# Patient Record
Sex: MALE | Race: WHITE | Employment: OTHER | URBAN - METROPOLITAN AREA
[De-identification: names, ages, dates, MRNs, and addresses within clinical notes are randomized per-mention and may not be internally consistent; named-entity substitution may affect disease eponyms.]

---

## 2023-06-22 ENCOUNTER — APPOINTMENT (OUTPATIENT)
Dept: GENERAL RADIOLOGY | Age: 63
End: 2023-06-22
Payer: MEDICAID

## 2023-06-22 ENCOUNTER — HOSPITAL ENCOUNTER (EMERGENCY)
Age: 63
Discharge: HOME | End: 2023-06-22
Attending: EMERGENCY MEDICINE
Payer: MEDICAID

## 2023-06-22 VITALS — HEART RATE: 58 BPM | OXYGEN SATURATION: 98 % | SYSTOLIC BLOOD PRESSURE: 120 MMHG | DIASTOLIC BLOOD PRESSURE: 93 MMHG

## 2023-06-22 DIAGNOSIS — R45.851 DEPRESSION WITH SUICIDAL IDEATION: Primary | ICD-10-CM

## 2023-06-22 DIAGNOSIS — F32.A DEPRESSION WITH SUICIDAL IDEATION: Primary | ICD-10-CM

## 2023-06-22 PROBLEM — F41.9 ANXIETY AND DEPRESSION: Status: ACTIVE | Noted: 2023-06-22

## 2023-06-22 PROBLEM — G47.9 SLEEP DISTURBANCES: Status: ACTIVE | Noted: 2023-06-22

## 2023-06-22 PROBLEM — Z65.8 PSYCHOSOCIAL STRESSORS: Status: ACTIVE | Noted: 2023-06-22

## 2023-06-22 PROBLEM — Z91.199 NONCOMPLIANCE: Status: ACTIVE | Noted: 2023-06-22

## 2023-06-22 PROBLEM — Z59.819 HOUSING INSTABILITY: Status: ACTIVE | Noted: 2023-06-22

## 2023-06-22 LAB
ALBUMIN SERPL-MCNC: 3.4 G/DL (ref 3.2–4.6)
ALBUMIN/GLOB SERPL: 0.9 (ref 0.4–1.6)
ALP SERPL-CCNC: 70 U/L (ref 50–136)
ALT SERPL-CCNC: 21 U/L (ref 12–65)
AMPHET UR QL SCN: NEGATIVE
ANION GAP SERPL CALC-SCNC: 4 MMOL/L (ref 2–11)
AST SERPL-CCNC: 19 U/L (ref 15–37)
BARBITURATES UR QL SCN: NEGATIVE
BASOPHILS # BLD: 0.1 K/UL (ref 0–0.2)
BASOPHILS NFR BLD: 1 % (ref 0–2)
BENZODIAZ UR QL: POSITIVE
BILIRUB SERPL-MCNC: 0.5 MG/DL (ref 0.2–1.1)
BUN SERPL-MCNC: 7 MG/DL (ref 8–23)
CALCIUM SERPL-MCNC: 8.8 MG/DL (ref 8.3–10.4)
CANNABINOIDS UR QL SCN: NEGATIVE
CHLORIDE SERPL-SCNC: 109 MMOL/L (ref 101–110)
CO2 SERPL-SCNC: 26 MMOL/L (ref 21–32)
COCAINE UR QL SCN: NEGATIVE
CREAT SERPL-MCNC: 0.8 MG/DL (ref 0.8–1.5)
DIFFERENTIAL METHOD BLD: ABNORMAL
EOSINOPHIL # BLD: 0.1 K/UL (ref 0–0.8)
EOSINOPHIL NFR BLD: 1 % (ref 0.5–7.8)
ERYTHROCYTE [DISTWIDTH] IN BLOOD BY AUTOMATED COUNT: 15.4 % (ref 11.9–14.6)
ETHANOL SERPL-MCNC: <3 MG/DL (ref 0–0.08)
GLOBULIN SER CALC-MCNC: 3.9 G/DL (ref 2.8–4.5)
GLUCOSE SERPL-MCNC: 81 MG/DL (ref 65–100)
HCT VFR BLD AUTO: 40.1 % (ref 41.1–50.3)
HGB BLD-MCNC: 13.3 G/DL (ref 13.6–17.2)
IMM GRANULOCYTES # BLD AUTO: 0 K/UL (ref 0–0.5)
IMM GRANULOCYTES NFR BLD AUTO: 0 % (ref 0–5)
LYMPHOCYTES # BLD: 1.7 K/UL (ref 0.5–4.6)
LYMPHOCYTES NFR BLD: 27 % (ref 13–44)
MCH RBC QN AUTO: 29.4 PG (ref 26.1–32.9)
MCHC RBC AUTO-ENTMCNC: 33.2 G/DL (ref 31.4–35)
MCV RBC AUTO: 88.5 FL (ref 82–102)
METHADONE UR QL: NEGATIVE
MONOCYTES # BLD: 0.7 K/UL (ref 0.1–1.3)
MONOCYTES NFR BLD: 11 % (ref 4–12)
NEUTS SEG # BLD: 3.6 K/UL (ref 1.7–8.2)
NEUTS SEG NFR BLD: 60 % (ref 43–78)
NRBC # BLD: 0 K/UL (ref 0–0.2)
NT PRO BNP: 456 PG/ML (ref 5–125)
OPIATES UR QL: NEGATIVE
PCP UR QL: NEGATIVE
PLATELET # BLD AUTO: 244 K/UL (ref 150–450)
PMV BLD AUTO: 10.5 FL (ref 9.4–12.3)
POTASSIUM SERPL-SCNC: 3.7 MMOL/L (ref 3.5–5.1)
PROT SERPL-MCNC: 7.3 G/DL (ref 6.3–8.2)
RBC # BLD AUTO: 4.53 M/UL (ref 4.23–5.6)
SODIUM SERPL-SCNC: 139 MMOL/L (ref 133–143)
TROPONIN I SERPL HS-MCNC: 6.4 PG/ML (ref 0–57)
WBC # BLD AUTO: 6.1 K/UL (ref 4.3–11.1)

## 2023-06-22 PROCEDURE — 80307 DRUG TEST PRSMV CHEM ANLYZR: CPT

## 2023-06-22 PROCEDURE — 85025 COMPLETE CBC W/AUTO DIFF WBC: CPT

## 2023-06-22 PROCEDURE — 83880 ASSAY OF NATRIURETIC PEPTIDE: CPT

## 2023-06-22 PROCEDURE — 99285 EMERGENCY DEPT VISIT HI MDM: CPT

## 2023-06-22 PROCEDURE — 6360000002 HC RX W HCPCS: Performed by: EMERGENCY MEDICINE

## 2023-06-22 PROCEDURE — 6370000000 HC RX 637 (ALT 250 FOR IP): Performed by: EMERGENCY MEDICINE

## 2023-06-22 PROCEDURE — 84484 ASSAY OF TROPONIN QUANT: CPT

## 2023-06-22 PROCEDURE — 82077 ASSAY SPEC XCP UR&BREATH IA: CPT

## 2023-06-22 PROCEDURE — 71045 X-RAY EXAM CHEST 1 VIEW: CPT

## 2023-06-22 PROCEDURE — 80053 COMPREHEN METABOLIC PANEL: CPT

## 2023-06-22 PROCEDURE — 96374 THER/PROPH/DIAG INJ IV PUSH: CPT

## 2023-06-22 PROCEDURE — 90792 PSYCH DIAG EVAL W/MED SRVCS: CPT | Performed by: NURSE PRACTITIONER

## 2023-06-22 RX ORDER — QUETIAPINE FUMARATE 200 MG/1
200 TABLET, FILM COATED ORAL 2 TIMES DAILY
Qty: 60 TABLET | Refills: 3 | Status: SHIPPED | OUTPATIENT
Start: 2023-06-22

## 2023-06-22 RX ORDER — THIAMINE HYDROCHLORIDE 100 MG/ML
100 INJECTION, SOLUTION INTRAMUSCULAR; INTRAVENOUS ONCE
Status: COMPLETED | OUTPATIENT
Start: 2023-06-22 | End: 2023-06-22

## 2023-06-22 RX ORDER — TRAZODONE HYDROCHLORIDE 150 MG/1
150 TABLET ORAL NIGHTLY PRN
Qty: 30 TABLET | Refills: 3 | Status: SHIPPED | OUTPATIENT
Start: 2023-06-22

## 2023-06-22 RX ORDER — LORAZEPAM 1 MG/1
1 TABLET ORAL ONCE
Status: COMPLETED | OUTPATIENT
Start: 2023-06-22 | End: 2023-06-22

## 2023-06-22 RX ADMIN — LORAZEPAM 1 MG: 1 TABLET ORAL at 12:03

## 2023-06-22 RX ADMIN — THIAMINE HYDROCHLORIDE 100 MG: 100 INJECTION, SOLUTION INTRAMUSCULAR; INTRAVENOUS at 12:03

## 2023-06-22 ASSESSMENT — ANXIETY QUESTIONNAIRES
2. NOT BEING ABLE TO STOP OR CONTROL WORRYING: 1
3. WORRYING TOO MUCH ABOUT DIFFERENT THINGS: 1
5. BEING SO RESTLESS THAT IT IS HARD TO SIT STILL: 1
7. FEELING AFRAID AS IF SOMETHING AWFUL MIGHT HAPPEN: 1
6. BECOMING EASILY ANNOYED OR IRRITABLE: 1
GAD7 TOTAL SCORE: 8
1. FEELING NERVOUS, ANXIOUS, OR ON EDGE: 2
4. TROUBLE RELAXING: 1

## 2023-06-22 ASSESSMENT — PATIENT HEALTH QUESTIONNAIRE - PHQ9: SUM OF ALL RESPONSES TO PHQ QUESTIONS 1-9: 7

## 2023-06-22 ASSESSMENT — ENCOUNTER SYMPTOMS: BACK PAIN: 1

## 2023-08-16 ENCOUNTER — HOSPITAL ENCOUNTER (EMERGENCY)
Age: 63
Discharge: HOME | End: 2023-08-17
Attending: EMERGENCY MEDICINE
Payer: MEDICAID

## 2023-08-16 DIAGNOSIS — F10.920 ACUTE ALCOHOLIC INTOXICATION WITHOUT COMPLICATION (HCC): ICD-10-CM

## 2023-08-16 DIAGNOSIS — R45.851 SUICIDAL IDEATION: ICD-10-CM

## 2023-08-16 DIAGNOSIS — I48.91 ATRIAL FIBRILLATION WITH RAPID VENTRICULAR RESPONSE (HCC): Primary | ICD-10-CM

## 2023-08-16 LAB
ALBUMIN SERPL-MCNC: 3.8 G/DL (ref 3.2–4.6)
ALBUMIN/GLOB SERPL: 0.8 (ref 0.4–1.6)
ALP SERPL-CCNC: 72 U/L (ref 50–136)
ALT SERPL-CCNC: 66 U/L (ref 12–65)
ANION GAP SERPL CALC-SCNC: 13 MMOL/L (ref 2–11)
APAP SERPL-MCNC: <2 UG/ML (ref 10–30)
AST SERPL-CCNC: 119 U/L (ref 15–37)
BASOPHILS # BLD: 0.1 K/UL (ref 0–0.2)
BASOPHILS NFR BLD: 1 % (ref 0–2)
BILIRUB SERPL-MCNC: 1 MG/DL (ref 0.2–1.1)
BUN SERPL-MCNC: 10 MG/DL (ref 8–23)
CALCIUM SERPL-MCNC: 8.7 MG/DL (ref 8.3–10.4)
CHLORIDE SERPL-SCNC: 101 MMOL/L (ref 101–110)
CO2 SERPL-SCNC: 21 MMOL/L (ref 21–32)
CREAT SERPL-MCNC: 0.8 MG/DL (ref 0.8–1.5)
DIFFERENTIAL METHOD BLD: ABNORMAL
EKG ATRIAL RATE: 137 BPM
EKG DIAGNOSIS: NORMAL
EKG Q-T INTERVAL: 316 MS
EKG QRS DURATION: 81 MS
EKG QTC CALCULATION (BAZETT): 456 MS
EKG R AXIS: 89 DEGREES
EKG T AXIS: 67 DEGREES
EKG VENTRICULAR RATE: 125 BPM
EOSINOPHIL # BLD: 0 K/UL (ref 0–0.8)
EOSINOPHIL NFR BLD: 1 % (ref 0.5–7.8)
ERYTHROCYTE [DISTWIDTH] IN BLOOD BY AUTOMATED COUNT: 17.7 % (ref 11.9–14.6)
ETHANOL SERPL-MCNC: 268 MG/DL (ref 0–0.08)
GLOBULIN SER CALC-MCNC: 4.5 G/DL (ref 2.8–4.5)
GLUCOSE SERPL-MCNC: 103 MG/DL (ref 65–100)
HCT VFR BLD AUTO: 49.6 % (ref 41.1–50.3)
HGB BLD-MCNC: 16.8 G/DL (ref 13.6–17.2)
IMM GRANULOCYTES # BLD AUTO: 0 K/UL (ref 0–0.5)
IMM GRANULOCYTES NFR BLD AUTO: 1 % (ref 0–5)
LYMPHOCYTES # BLD: 1.8 K/UL (ref 0.5–4.6)
LYMPHOCYTES NFR BLD: 31 % (ref 13–44)
MAGNESIUM SERPL-MCNC: 2 MG/DL (ref 1.8–2.4)
MCH RBC QN AUTO: 29.2 PG (ref 26.1–32.9)
MCHC RBC AUTO-ENTMCNC: 33.9 G/DL (ref 31.4–35)
MCV RBC AUTO: 86.3 FL (ref 82–102)
MONOCYTES # BLD: 0.4 K/UL (ref 0.1–1.3)
MONOCYTES NFR BLD: 6 % (ref 4–12)
NEUTS SEG # BLD: 3.6 K/UL (ref 1.7–8.2)
NEUTS SEG NFR BLD: 61 % (ref 43–78)
NRBC # BLD: 0 K/UL (ref 0–0.2)
PLATELET # BLD AUTO: 177 K/UL (ref 150–450)
PMV BLD AUTO: 11.8 FL (ref 9.4–12.3)
POTASSIUM SERPL-SCNC: 4.8 MMOL/L (ref 3.5–5.1)
PROT SERPL-MCNC: 8.3 G/DL (ref 6.3–8.2)
RBC # BLD AUTO: 5.75 M/UL (ref 4.23–5.6)
SALICYLATES SERPL-MCNC: 2.1 MG/DL (ref 2.8–20)
SODIUM SERPL-SCNC: 135 MMOL/L (ref 133–143)
WBC # BLD AUTO: 6 K/UL (ref 4.3–11.1)

## 2023-08-16 PROCEDURE — 6360000002 HC RX W HCPCS: Performed by: EMERGENCY MEDICINE

## 2023-08-16 PROCEDURE — 96374 THER/PROPH/DIAG INJ IV PUSH: CPT

## 2023-08-16 PROCEDURE — 6370000000 HC RX 637 (ALT 250 FOR IP): Performed by: EMERGENCY MEDICINE

## 2023-08-16 PROCEDURE — 83735 ASSAY OF MAGNESIUM: CPT

## 2023-08-16 PROCEDURE — 93010 ELECTROCARDIOGRAM REPORT: CPT | Performed by: INTERNAL MEDICINE

## 2023-08-16 PROCEDURE — 80143 DRUG ASSAY ACETAMINOPHEN: CPT

## 2023-08-16 PROCEDURE — 93005 ELECTROCARDIOGRAM TRACING: CPT | Performed by: EMERGENCY MEDICINE

## 2023-08-16 PROCEDURE — 80179 DRUG ASSAY SALICYLATE: CPT

## 2023-08-16 PROCEDURE — 2500000003 HC RX 250 WO HCPCS: Performed by: EMERGENCY MEDICINE

## 2023-08-16 PROCEDURE — 96375 TX/PRO/DX INJ NEW DRUG ADDON: CPT

## 2023-08-16 PROCEDURE — 99285 EMERGENCY DEPT VISIT HI MDM: CPT

## 2023-08-16 PROCEDURE — 80053 COMPREHEN METABOLIC PANEL: CPT

## 2023-08-16 PROCEDURE — 82077 ASSAY SPEC XCP UR&BREATH IA: CPT

## 2023-08-16 PROCEDURE — 96376 TX/PRO/DX INJ SAME DRUG ADON: CPT

## 2023-08-16 PROCEDURE — 85025 COMPLETE CBC W/AUTO DIFF WBC: CPT

## 2023-08-16 RX ORDER — LORAZEPAM 1 MG/1
1 TABLET ORAL
Status: DISCONTINUED | OUTPATIENT
Start: 2023-08-16 | End: 2023-08-17 | Stop reason: HOSPADM

## 2023-08-16 RX ORDER — LORAZEPAM 2 MG/ML
3 INJECTION INTRAMUSCULAR
Status: DISCONTINUED | OUTPATIENT
Start: 2023-08-16 | End: 2023-08-17 | Stop reason: HOSPADM

## 2023-08-16 RX ORDER — LORAZEPAM 2 MG/ML
4 INJECTION INTRAMUSCULAR
Status: DISCONTINUED | OUTPATIENT
Start: 2023-08-16 | End: 2023-08-17 | Stop reason: HOSPADM

## 2023-08-16 RX ORDER — LORAZEPAM 2 MG/ML
2 INJECTION INTRAMUSCULAR
Status: DISCONTINUED | OUTPATIENT
Start: 2023-08-16 | End: 2023-08-17 | Stop reason: HOSPADM

## 2023-08-16 RX ORDER — SODIUM CHLORIDE 0.9 % (FLUSH) 0.9 %
5-40 SYRINGE (ML) INJECTION PRN
Status: DISCONTINUED | OUTPATIENT
Start: 2023-08-16 | End: 2023-08-17 | Stop reason: HOSPADM

## 2023-08-16 RX ORDER — MAGNESIUM HYDROXIDE/ALUMINUM HYDROXICE/SIMETHICONE 120; 1200; 1200 MG/30ML; MG/30ML; MG/30ML
30 SUSPENSION ORAL EVERY 6 HOURS PRN
Status: DISCONTINUED | OUTPATIENT
Start: 2023-08-16 | End: 2023-08-17 | Stop reason: HOSPADM

## 2023-08-16 RX ORDER — SODIUM CHLORIDE 9 MG/ML
INJECTION, SOLUTION INTRAVENOUS PRN
Status: DISCONTINUED | OUTPATIENT
Start: 2023-08-16 | End: 2023-08-17 | Stop reason: HOSPADM

## 2023-08-16 RX ORDER — LORAZEPAM 1 MG/1
2 TABLET ORAL
Status: DISCONTINUED | OUTPATIENT
Start: 2023-08-16 | End: 2023-08-17 | Stop reason: HOSPADM

## 2023-08-16 RX ORDER — LORAZEPAM 2 MG/ML
1 INJECTION INTRAMUSCULAR ONCE
Status: COMPLETED | OUTPATIENT
Start: 2023-08-16 | End: 2023-08-16

## 2023-08-16 RX ORDER — METOPROLOL TARTRATE 5 MG/5ML
5 INJECTION INTRAVENOUS
Status: COMPLETED | OUTPATIENT
Start: 2023-08-16 | End: 2023-08-16

## 2023-08-16 RX ORDER — LANOLIN ALCOHOL/MO/W.PET/CERES
100 CREAM (GRAM) TOPICAL DAILY
Status: DISCONTINUED | OUTPATIENT
Start: 2023-08-17 | End: 2023-08-17 | Stop reason: HOSPADM

## 2023-08-16 RX ORDER — LORAZEPAM 1 MG/1
3 TABLET ORAL
Status: DISCONTINUED | OUTPATIENT
Start: 2023-08-16 | End: 2023-08-17 | Stop reason: HOSPADM

## 2023-08-16 RX ORDER — LORAZEPAM 1 MG/1
4 TABLET ORAL
Status: DISCONTINUED | OUTPATIENT
Start: 2023-08-16 | End: 2023-08-17 | Stop reason: HOSPADM

## 2023-08-16 RX ORDER — HYDROCODONE BITARTRATE AND ACETAMINOPHEN 5; 325 MG/1; MG/1
1 TABLET ORAL ONCE
Status: COMPLETED | OUTPATIENT
Start: 2023-08-16 | End: 2023-08-16

## 2023-08-16 RX ORDER — LORAZEPAM 2 MG/ML
1 INJECTION INTRAMUSCULAR
Status: DISCONTINUED | OUTPATIENT
Start: 2023-08-16 | End: 2023-08-17 | Stop reason: HOSPADM

## 2023-08-16 RX ORDER — FAMOTIDINE 20 MG/1
20 TABLET, FILM COATED ORAL 2 TIMES DAILY
Status: DISCONTINUED | OUTPATIENT
Start: 2023-08-16 | End: 2023-08-17 | Stop reason: HOSPADM

## 2023-08-16 RX ORDER — SODIUM CHLORIDE 0.9 % (FLUSH) 0.9 %
5-40 SYRINGE (ML) INJECTION EVERY 12 HOURS SCHEDULED
Status: DISCONTINUED | OUTPATIENT
Start: 2023-08-16 | End: 2023-08-17 | Stop reason: HOSPADM

## 2023-08-16 RX ADMIN — METOPROLOL TARTRATE 5 MG: 5 INJECTION INTRAVENOUS at 20:55

## 2023-08-16 RX ADMIN — METOPROLOL TARTRATE 5 MG: 5 INJECTION INTRAVENOUS at 21:30

## 2023-08-16 RX ADMIN — METOPROLOL TARTRATE 25 MG: 25 TABLET, FILM COATED ORAL at 21:30

## 2023-08-16 RX ADMIN — LORAZEPAM 1 MG: 2 INJECTION INTRAMUSCULAR; INTRAVENOUS at 21:30

## 2023-08-16 RX ADMIN — METOPROLOL TARTRATE 5 MG: 5 INJECTION INTRAVENOUS at 19:47

## 2023-08-16 RX ADMIN — HYDROCODONE BITARTRATE AND ACETAMINOPHEN 1 TABLET: 5; 325 TABLET ORAL at 21:30

## 2023-08-16 RX ADMIN — FAMOTIDINE 20 MG: 20 TABLET, FILM COATED ORAL at 23:20

## 2023-08-16 RX ADMIN — LORAZEPAM 1 MG: 2 INJECTION INTRAMUSCULAR; INTRAVENOUS at 19:47

## 2023-08-16 ASSESSMENT — ENCOUNTER SYMPTOMS
ABDOMINAL PAIN: 0
SHORTNESS OF BREATH: 0
COUGH: 0
VOMITING: 0

## 2023-08-16 ASSESSMENT — PAIN - FUNCTIONAL ASSESSMENT: PAIN_FUNCTIONAL_ASSESSMENT: 0-10

## 2023-08-16 ASSESSMENT — PAIN SCALES - GENERAL: PAINLEVEL_OUTOF10: 0

## 2023-08-16 ASSESSMENT — LIFESTYLE VARIABLES
HOW MANY STANDARD DRINKS CONTAINING ALCOHOL DO YOU HAVE ON A TYPICAL DAY: 10 OR MORE
HOW OFTEN DO YOU HAVE A DRINK CONTAINING ALCOHOL: 4 OR MORE TIMES A WEEK

## 2023-08-16 NOTE — ED PROVIDER NOTES
Emergency Department Provider Note       PCP: None None (Inactive)   Age: 65 y.o. Sex: male     [address] 08/16/2023 10:11:54 PM       ICD-10-CM    1. Atrial fibrillation with rapid ventricular response (HCC)  I48.91       2. Acute alcoholic intoxication without complication (HCC)  O80.667       3. Suicidal ideation  R45.851           Medical Decision Making   Depressed patient with suicidal ideations. Likely will need hospitalization. We will have psychiatric evaluation toward morning. Regarding atrial fibrillation, low-dose IV beta-blocker. Also may have some DTs and will give a small dose of IV Ativan. Check screening lab work. Complexity of Problems Addressed:  1 or more acute illnesses that pose a threat to life or bodily function. Data Reviewed and Analyzed:   I independently ordered and reviewed each unique test.  I reviewed external records: provider visit note from outside specialist.  Previous admission for atrial fibrillation along with alcohol withdrawal.  The patients assessment required an independent historian: EMS. The reason they were needed is important historical information not provided by the patient. Heart rate control at the scene. Other vital signs    I independently ordered and interpreted the ED EKG in the absence of a Cardiologist.    Rate: 125  EKG Interpretation: EKG Interpretation: atrial fibrillation  ST Segments: Nonspecific ST segments - NO STEMI      Discussion of management or test interpretation. Received some IV Ativan and some IV then p.o. Lopressor. Heart rate less than 100. Suspect atrial febrile rapid ventricular response due to patient not taking medications and also some mild withdrawal symptoms and perhaps some dehydration. Patient agrees to rest here to metabolize alcohol, treating withdrawal symptoms and talk with psychiatry in the morning.       Risk of Complications and/or Morbidity of Patient Management:  Parental

## 2023-08-16 NOTE — ED NOTES
[facility]; sitter is at bedside. Pt in transition from clothing to paper scrubs. Oncoming nurse notified. Pt on cardiac monitor for afib RVR.         Roz Wong RN  08/16/23 1910

## 2023-08-16 NOTE — ED TRIAGE NOTES
Pt arrived via EMS from home. Roommate called for pt fatigue/not eating. Pt reports drinking vodka today/yesterday. When EMS arrived pt sat low 90s on monitor; placed on 2L NC, pt afib RVR (150-160). Pt given 700ml saline. Pt reporting SI \" I want to walk into traffic\" \" I want to stab myself with a knife\" \"I want to fill a syringe with air and inject it in my veins\". \" As soon as I get out of here I'm going to do it\". Pt repeatedly telling nurse he's an alcoholic and drink vodka daily.  Pt reports drinking a pint of vodka today

## 2023-08-17 VITALS
RESPIRATION RATE: 20 BRPM | BODY MASS INDEX: 24.4 KG/M2 | TEMPERATURE: 97.7 F | HEART RATE: 51 BPM | WEIGHT: 161 LBS | DIASTOLIC BLOOD PRESSURE: 103 MMHG | OXYGEN SATURATION: 98 % | HEIGHT: 68 IN | SYSTOLIC BLOOD PRESSURE: 155 MMHG

## 2023-08-17 PROBLEM — F10.10 ALCOHOL ABUSE: Status: ACTIVE | Noted: 2023-08-17

## 2023-08-17 PROBLEM — F10.920 ACUTE ALCOHOLIC INTOXICATION WITHOUT COMPLICATION (HCC): Status: ACTIVE | Noted: 2023-08-17

## 2023-08-17 LAB
AMPHET UR QL SCN: NEGATIVE
BACTERIA URNS QL MICRO: ABNORMAL /HPF
BARBITURATES UR QL SCN: NEGATIVE
BENZODIAZ UR QL: NEGATIVE
BILIRUB UR QL: ABNORMAL
CANNABINOIDS UR QL SCN: NEGATIVE
CASTS URNS QL MICRO: ABNORMAL /LPF
COCAINE UR QL SCN: NEGATIVE
CRYSTALS URNS QL MICRO: 0 /LPF
EPI CELLS #/AREA URNS HPF: ABNORMAL /HPF
GLUCOSE UR QL STRIP.AUTO: 100 MG/DL
KETONES UR-MCNC: 15 MG/DL
LEUKOCYTE ESTERASE UR QL STRIP: NEGATIVE
METHADONE UR QL: NEGATIVE
MUCOUS THREADS URNS QL MICRO: ABNORMAL /LPF
NITRITE UR QL: POSITIVE
OPIATES UR QL: POSITIVE
PCP UR QL: NEGATIVE
PH UR: 6 (ref 5–9)
PROT UR QL: >300 MG/DL
RBC # UR STRIP: NEGATIVE
RBC #/AREA URNS HPF: ABNORMAL /HPF
SERVICE CMNT-IMP: ABNORMAL
SP GR UR: >1.03 (ref 1–1.02)
UROBILINOGEN UR QL: >=8 EU/DL (ref 0.2–1)
WBC URNS QL MICRO: ABNORMAL /HPF

## 2023-08-17 PROCEDURE — 6370000000 HC RX 637 (ALT 250 FOR IP): Performed by: EMERGENCY MEDICINE

## 2023-08-17 PROCEDURE — 80307 DRUG TEST PRSMV CHEM ANLYZR: CPT

## 2023-08-17 PROCEDURE — 96376 TX/PRO/DX INJ SAME DRUG ADON: CPT

## 2023-08-17 PROCEDURE — 81003 URINALYSIS AUTO W/O SCOPE: CPT

## 2023-08-17 PROCEDURE — 81001 URINALYSIS AUTO W/SCOPE: CPT

## 2023-08-17 PROCEDURE — 81015 MICROSCOPIC EXAM OF URINE: CPT

## 2023-08-17 PROCEDURE — 6360000002 HC RX W HCPCS: Performed by: EMERGENCY MEDICINE

## 2023-08-17 RX ORDER — QUETIAPINE FUMARATE 100 MG/1
200 TABLET, FILM COATED ORAL 2 TIMES DAILY
Status: DISCONTINUED | OUTPATIENT
Start: 2023-08-17 | End: 2023-08-17 | Stop reason: HOSPADM

## 2023-08-17 RX ORDER — TRAZODONE HYDROCHLORIDE 100 MG/1
TABLET ORAL
COMMUNITY
Start: 2023-07-19 | End: 2023-08-17 | Stop reason: SDUPTHER

## 2023-08-17 RX ORDER — APIXABAN 5 MG/1
TABLET, FILM COATED ORAL
COMMUNITY
Start: 2023-06-15 | End: 2023-08-17 | Stop reason: SDUPTHER

## 2023-08-17 RX ORDER — TRAZODONE HYDROCHLORIDE 50 MG/1
150 TABLET ORAL NIGHTLY
Status: DISCONTINUED | OUTPATIENT
Start: 2023-08-17 | End: 2023-08-17 | Stop reason: HOSPADM

## 2023-08-17 RX ORDER — TRAZODONE HYDROCHLORIDE 100 MG/1
100 TABLET ORAL NIGHTLY
Qty: 30 TABLET | Refills: 0 | Status: SHIPPED | OUTPATIENT
Start: 2023-08-17 | End: 2023-09-16

## 2023-08-17 RX ORDER — QUETIAPINE FUMARATE 200 MG/1
200 TABLET, FILM COATED ORAL NIGHTLY
Qty: 60 TABLET | Refills: 3 | Status: SHIPPED | OUTPATIENT
Start: 2023-08-17

## 2023-08-17 RX ORDER — METOPROLOL SUCCINATE 25 MG/1
25 TABLET, EXTENDED RELEASE ORAL DAILY
Status: DISCONTINUED | OUTPATIENT
Start: 2023-08-17 | End: 2023-08-17 | Stop reason: HOSPADM

## 2023-08-17 RX ORDER — APIXABAN 5 MG/1
5 TABLET, FILM COATED ORAL 2 TIMES DAILY
Qty: 60 TABLET | Refills: 0 | Status: SHIPPED | OUTPATIENT
Start: 2023-08-17

## 2023-08-17 RX ADMIN — ALUMINUM HYDROXIDE, MAGNESIUM HYDROXIDE, AND SIMETHICONE 30 ML: 200; 200; 20 SUSPENSION ORAL at 00:29

## 2023-08-17 RX ADMIN — METOPROLOL SUCCINATE 25 MG: 25 TABLET, EXTENDED RELEASE ORAL at 10:44

## 2023-08-17 RX ADMIN — TRAZODONE HYDROCHLORIDE 150 MG: 50 TABLET ORAL at 00:26

## 2023-08-17 RX ADMIN — LORAZEPAM 2 MG: 2 INJECTION INTRAMUSCULAR; INTRAVENOUS at 05:27

## 2023-08-17 RX ADMIN — QUETIAPINE FUMARATE 200 MG: 100 TABLET ORAL at 10:44

## 2023-08-17 RX ADMIN — Medication 100 MG: at 10:44

## 2023-08-17 RX ADMIN — FAMOTIDINE 20 MG: 20 TABLET, FILM COATED ORAL at 10:44

## 2023-08-17 RX ADMIN — QUETIAPINE FUMARATE 200 MG: 100 TABLET ORAL at 00:26

## 2023-08-17 ASSESSMENT — PAIN SCALES - GENERAL: PAINLEVEL_OUTOF10: 10

## 2023-08-17 ASSESSMENT — PAIN - FUNCTIONAL ASSESSMENT: PAIN_FUNCTIONAL_ASSESSMENT: 0-10

## 2023-08-17 ASSESSMENT — PAIN DESCRIPTION - LOCATION: LOCATION: SHOULDER

## 2023-08-17 NOTE — ED NOTES
Patient resting at this time. No signs or symptoms of distress. Respirations even and unlabored. Sitter at bedside. Safety precautions in place.        Reinier Lehman RN  08/16/23 4763

## 2023-08-17 NOTE — CARE COORDINATION
Chart review complete, CM met with pt at bedside, pt found laying on stretcher alert and oriented x4, noted very sleepy and easily falls back to sleep. Pt states he recently left the [employer] to live in a house with some other roommates pt states he can return if dc from ED today or after inpt admission if needed. Pt states he has been having issues with increased depression and having SI with plan to jump off bridge or walk out into traffic but states he is too afraid to do so d/t he does not want to harm himself. Pt was seen in ED in June 2023 and referred to the [facility] where he states he has been following up. Pt states heavy ETOH use over that last several days. Pt denies auditory or visual hallucinations at this time. Pt is awaiting psych evaluation today. CM will made appropriate referrals as needed. 08/17/23 5022   Service Assessment   Patient Orientation Alert and Oriented   Cognition Alert   History Provided By Patient   Primary Caregiver Self   Accompanied By/Relationship none   Support Systems None   PCP Verified by CM Yes  ([facility] per pt last visit a couple weeks ago)   Last Visit to PCP Within last 3 months   Prior Functional Level Independent in ADLs/IADLs   Current Functional Level Independent in ADLs/IADLs   Can patient return to prior living arrangement Yes   Ability to make needs known: Good   Family able to assist with home care needs: No   Would you like for me to discuss the discharge plan with any other family members/significant others, and if so, who?  No   Financial Resources [place] None   CM/SW Referral Psychiatry   Social/Functional History   Lives With Friend(s)   Type of Home House   Bathroom Shower/Tub Tub/Shower unit   [address] Independent   Transfer Assistance Independent   Occupation Unemployed   Discharge Planning   Type of Residence

## 2023-08-17 NOTE — CARE COORDINATION
Pt has been evaluated by Eunice Quiles NP and cleared for dc home today, pt has followup appointment for Oct 6, 2023 at [facility]. MD and primary RN provided information.        08/17/23 1403   Service Assessment   Patient Orientation Alert and Oriented   Discharge Planning   Type of [address] Friends   Patient expects to be discharged to: [facility]   Transition of Care Consult (CM Consult) Other  [facility] follow up)   Services At/After Discharge In cab

## 2023-08-17 NOTE — ED NOTES
Patient placed in paper scrubs and security did wand patient as well.       Osito Jin RN  08/16/23 3363

## 2023-08-17 NOTE — ED NOTES
Patient resting at this time. No signs or symptoms of distress. Respirations even and unlabored. Sitter at bedside. Safety precautions in place.        Bautista Armando RN  08/17/23 5612

## 2023-08-17 NOTE — ED NOTES
Patient resting at this time. No signs or symptoms of distress. Respirations even and unlabored. Sitter at bedside. Safety precautions in place.        Aurea Sanchez RN  08/17/23 7983

## 2023-08-17 NOTE — ED NOTES
Pt resting in bed with eyes closed, sitter at bedside, resp easy, will continue to monitor      Michelle Arita RN  08/17/23 1615

## 2023-08-17 NOTE — ED NOTES
Patient resting at this time. No signs or symptoms of distress. Respirations even and unlabored. Sitter at bedside. Safety precautions in place.        Pita West RN  08/17/23 3180

## 2023-08-17 NOTE — ED NOTES
Patient resting at this time. No signs or symptoms of distress. Respirations even and unlabored. Sitter at bedside. Safety precautions in place.        Kenney Dalton RN  08/17/23 5455

## 2023-08-17 NOTE — ED NOTES
Pt given hot lunch with sprite and sitting up and eating. No signs of distress.      Joane Lennox, RN  08/17/23 3245

## 2023-08-17 NOTE — DISCHARGE INSTRUCTIONS
Take medications as prescribed  Limit your alcohol intake  Drink plenty of clear liquids  Follow-up with mental health as scheduled  Follow-up with your regular medical doctors as soon as possible    Return to ER for any worsening symptoms or new problems which may arise
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
## 2023-08-17 NOTE — ED PROVIDER NOTES
Recheck note  8/17/2023    Patient evaluated today  He is currently awake alert  States he is feeling better  Vital signs this morning revealed no fever normal O2 sats normal blood pressure heart rate remains slightly elevated in the 1 teens  Patient has been seen and evaluated by psychiatry.   Currently denying any suicidal ideations    On exam he is awake alert and oriented  Ambulatory with a steady gait    Will discharge home today restarting Lopressor and Eliquis for his A-fib  Restarting Seroquel and trazodone    We will confirm mental health appointments prior to discharge     Bora Lin MD  08/17/23 7896

## 2023-08-27 ENCOUNTER — APPOINTMENT (OUTPATIENT)
Dept: GENERAL RADIOLOGY | Age: 63
End: 2023-08-27
Payer: MEDICAID

## 2023-08-27 ENCOUNTER — HOSPITAL ENCOUNTER (EMERGENCY)
Age: 63
Discharge: HOME | End: 2023-08-27
Attending: GENERAL PRACTICE
Payer: MEDICAID

## 2023-08-27 VITALS
OXYGEN SATURATION: 95 % | HEART RATE: 89 BPM | RESPIRATION RATE: 18 BRPM | SYSTOLIC BLOOD PRESSURE: 117 MMHG | WEIGHT: 230 LBS | TEMPERATURE: 97.5 F | HEIGHT: 68 IN | DIASTOLIC BLOOD PRESSURE: 65 MMHG | BODY MASS INDEX: 34.86 KG/M2

## 2023-08-27 DIAGNOSIS — J18.9 PNEUMONIA OF RIGHT LUNG DUE TO INFECTIOUS ORGANISM, UNSPECIFIED PART OF LUNG: ICD-10-CM

## 2023-08-27 DIAGNOSIS — U07.1 COVID-19 VIRUS INFECTION: Primary | ICD-10-CM

## 2023-08-27 PROCEDURE — 71045 X-RAY EXAM CHEST 1 VIEW: CPT

## 2023-08-27 PROCEDURE — 96372 THER/PROPH/DIAG INJ SC/IM: CPT

## 2023-08-27 PROCEDURE — 6360000002 HC RX W HCPCS: Performed by: GENERAL PRACTICE

## 2023-08-27 PROCEDURE — 99285 EMERGENCY DEPT VISIT HI MDM: CPT

## 2023-08-27 RX ORDER — LORAZEPAM 2 MG/ML
1 INJECTION INTRAMUSCULAR ONCE
Status: COMPLETED | OUTPATIENT
Start: 2023-08-27 | End: 2023-08-27

## 2023-08-27 RX ORDER — LORAZEPAM 1 MG/1
1 TABLET ORAL EVERY 4 HOURS PRN
Status: DISCONTINUED | OUTPATIENT
Start: 2023-08-27 | End: 2023-08-27

## 2023-08-27 RX ORDER — AZITHROMYCIN 250 MG/1
250 TABLET, FILM COATED ORAL SEE ADMIN INSTRUCTIONS
Qty: 6 TABLET | Refills: 0 | Status: SHIPPED | OUTPATIENT
Start: 2023-08-27 | End: 2023-09-01

## 2023-08-27 RX ADMIN — LORAZEPAM 1 MG: 2 INJECTION INTRAMUSCULAR; INTRAVENOUS at 17:06

## 2023-08-27 ASSESSMENT — PAIN DESCRIPTION - DESCRIPTORS: DESCRIPTORS: ACHING

## 2023-08-27 ASSESSMENT — PAIN DESCRIPTION - LOCATION: LOCATION: HEAD

## 2023-08-27 ASSESSMENT — PAIN DESCRIPTION - PAIN TYPE: TYPE: ACUTE PAIN

## 2023-08-27 ASSESSMENT — PAIN SCALES - GENERAL: PAINLEVEL_OUTOF10: 5

## 2023-08-27 ASSESSMENT — PAIN - FUNCTIONAL ASSESSMENT: PAIN_FUNCTIONAL_ASSESSMENT: 0-10

## 2023-08-27 NOTE — ED PROVIDER NOTES
Emergency Department Provider Note       PCP: None None   Age: 65 y.o. Sex: male     DISPOSITION Decision To Discharge 08/27/2023 05:57:12 PM       ICD-10-CM    1. COVID-19 virus infection  U07.1       2. Pneumonia of right lung due to infectious organism, unspecified part of lung  J18.9           Medical Decision Making     Complexity of Problems Addressed:  1 or more acute illnesses that pose a threat to life or bodily function. Data Reviewed and Analyzed:  I independently ordered and reviewed each unique test.  I reviewed external records: provider visit note from outside specialist.  I reviewed multidisciplinary note from [address] [place] [address] from June 13, 2023  The patients assessment required an independent historian: Paramedic. The reason they were needed is important historical information not provided by the patient. I interpreted the X-rays right upper lobe and middle lobe infiltrates. No edema normal heart size. I have reviewed the radiology report. Discussion of management or test interpretation. Patient presents with COVID-19 infection and shortness of breath. However on exam he is speaking full sentences and has no respiratory distress. His oxygen levels are normal.  He does have pneumonia which could be consistent with COVID-19. Nevertheless I will treat for bacterial pneumonia. I did want to get baseline blood work here but the patient was a difficult stick and he did not want any more attempts. I am okay with that considering his vital signs are stable and he has no respiratory distress. He has had the symptoms for about a week so I expect he probably is on the tail end of things. I will go ahead and empirically treat with antibiotics for potential superinfection bacterial pneumonia. However I do not feel he needs to be admitted to the hospital as he is not requiring oxygen and in no distress. He feels agreeable and stable to go back to [facility] as well.   Strict return precautions are

## 2023-10-23 ENCOUNTER — APPOINTMENT (OUTPATIENT)
Dept: GENERAL RADIOLOGY | Age: 63
End: 2023-10-23
Payer: MEDICAID

## 2023-10-23 ENCOUNTER — HOSPITAL ENCOUNTER (EMERGENCY)
Age: 63
Discharge: PSYCHIATRIC OR SUBSTANCE USE FACILITY | End: 2023-10-24
Attending: EMERGENCY MEDICINE
Payer: MEDICAID

## 2023-10-23 DIAGNOSIS — F10.10 ALCOHOL ABUSE: ICD-10-CM

## 2023-10-23 DIAGNOSIS — R45.851 SUICIDAL IDEATION: Primary | ICD-10-CM

## 2023-10-23 LAB
ALBUMIN SERPL-MCNC: 3.6 G/DL (ref 3.2–4.6)
ALBUMIN/GLOB SERPL: 0.7 (ref 0.4–1.6)
ALP SERPL-CCNC: 101 U/L (ref 50–136)
ALT SERPL-CCNC: 32 U/L (ref 12–65)
AMPHET UR QL SCN: NEGATIVE
ANION GAP SERPL CALC-SCNC: 9 MMOL/L (ref 2–11)
APAP SERPL-MCNC: <2 UG/ML (ref 10–30)
AST SERPL-CCNC: 61 U/L (ref 15–37)
BARBITURATES UR QL SCN: NEGATIVE
BASOPHILS # BLD: 0.1 K/UL (ref 0–0.2)
BASOPHILS NFR BLD: 1 % (ref 0–2)
BENZODIAZ UR QL: NEGATIVE
BILIRUB SERPL-MCNC: 0.6 MG/DL (ref 0.2–1.1)
BUN SERPL-MCNC: 4 MG/DL (ref 8–23)
CALCIUM SERPL-MCNC: 8.6 MG/DL (ref 8.3–10.4)
CANNABINOIDS UR QL SCN: NEGATIVE
CHLORIDE SERPL-SCNC: 107 MMOL/L (ref 101–110)
CO2 SERPL-SCNC: 24 MMOL/L (ref 21–32)
COCAINE UR QL SCN: NEGATIVE
CREAT SERPL-MCNC: 0.8 MG/DL (ref 0.8–1.5)
D DIMER PPP FEU-MCNC: 0.6 UG/ML(FEU)
DIFFERENTIAL METHOD BLD: ABNORMAL
EKG ATRIAL RATE: 233 BPM
EKG DIAGNOSIS: NORMAL
EKG Q-T INTERVAL: 356 MS
EKG QRS DURATION: 87 MS
EKG QTC CALCULATION (BAZETT): 482 MS
EKG R AXIS: 75 DEGREES
EKG T AXIS: 56 DEGREES
EKG VENTRICULAR RATE: 110 BPM
EOSINOPHIL # BLD: 0.1 K/UL (ref 0–0.8)
EOSINOPHIL NFR BLD: 1 % (ref 0.5–7.8)
ERYTHROCYTE [DISTWIDTH] IN BLOOD BY AUTOMATED COUNT: 15.7 % (ref 11.9–14.6)
ETHANOL SERPL-MCNC: 163 MG/DL (ref 0–0.08)
GLOBULIN SER CALC-MCNC: 5.4 G/DL (ref 2.8–4.5)
GLUCOSE SERPL-MCNC: 89 MG/DL (ref 65–100)
HCT VFR BLD AUTO: 48.5 % (ref 41.1–50.3)
HGB BLD-MCNC: 15.4 G/DL (ref 13.6–17.2)
IMM GRANULOCYTES # BLD AUTO: 0 K/UL (ref 0–0.5)
IMM GRANULOCYTES NFR BLD AUTO: 0 % (ref 0–5)
LIPASE SERPL-CCNC: 70 U/L (ref 73–393)
LYMPHOCYTES # BLD: 3.1 K/UL (ref 0.5–4.6)
LYMPHOCYTES NFR BLD: 35 % (ref 13–44)
MAGNESIUM SERPL-MCNC: 2.2 MG/DL (ref 1.8–2.4)
MCH RBC QN AUTO: 28.1 PG (ref 26.1–32.9)
MCHC RBC AUTO-ENTMCNC: 31.8 G/DL (ref 31.4–35)
MCV RBC AUTO: 88.5 FL (ref 82–102)
METHADONE UR QL: NEGATIVE
MONOCYTES # BLD: 0.7 K/UL (ref 0.1–1.3)
MONOCYTES NFR BLD: 9 % (ref 4–12)
NEUTS SEG # BLD: 4.7 K/UL (ref 1.7–8.2)
NEUTS SEG NFR BLD: 54 % (ref 43–78)
NRBC # BLD: 0 K/UL (ref 0–0.2)
NT PRO BNP: 133 PG/ML (ref 5–125)
OPIATES UR QL: NEGATIVE
PCP UR QL: NEGATIVE
PLATELET # BLD AUTO: 421 K/UL (ref 150–450)
PMV BLD AUTO: 9.9 FL (ref 9.4–12.3)
POTASSIUM SERPL-SCNC: 5 MMOL/L (ref 3.5–5.1)
PROT SERPL-MCNC: 9 G/DL (ref 6.3–8.2)
RBC # BLD AUTO: 5.48 M/UL (ref 4.23–5.6)
SALICYLATES SERPL-MCNC: <1.7 MG/DL (ref 2.8–20)
SODIUM SERPL-SCNC: 140 MMOL/L (ref 133–143)
TROPONIN I SERPL HS-MCNC: 16 PG/ML (ref 0–14)
TSH W FREE THYROID IF ABNORMAL: 2.28 UIU/ML (ref 0.36–3.74)
WBC # BLD AUTO: 8.7 K/UL (ref 4.3–11.1)

## 2023-10-23 PROCEDURE — 80143 DRUG ASSAY ACETAMINOPHEN: CPT

## 2023-10-23 PROCEDURE — 6360000002 HC RX W HCPCS: Performed by: EMERGENCY MEDICINE

## 2023-10-23 PROCEDURE — 93005 ELECTROCARDIOGRAM TRACING: CPT | Performed by: EMERGENCY MEDICINE

## 2023-10-23 PROCEDURE — 85025 COMPLETE CBC W/AUTO DIFF WBC: CPT

## 2023-10-23 PROCEDURE — 94760 N-INVAS EAR/PLS OXIMETRY 1: CPT

## 2023-10-23 PROCEDURE — 83690 ASSAY OF LIPASE: CPT

## 2023-10-23 PROCEDURE — 84484 ASSAY OF TROPONIN QUANT: CPT

## 2023-10-23 PROCEDURE — 93010 ELECTROCARDIOGRAM REPORT: CPT | Performed by: INTERNAL MEDICINE

## 2023-10-23 PROCEDURE — 80053 COMPREHEN METABOLIC PANEL: CPT

## 2023-10-23 PROCEDURE — 80307 DRUG TEST PRSMV CHEM ANLYZR: CPT

## 2023-10-23 PROCEDURE — 82077 ASSAY SPEC XCP UR&BREATH IA: CPT

## 2023-10-23 PROCEDURE — 71045 X-RAY EXAM CHEST 1 VIEW: CPT

## 2023-10-23 PROCEDURE — 96374 THER/PROPH/DIAG INJ IV PUSH: CPT

## 2023-10-23 PROCEDURE — 84443 ASSAY THYROID STIM HORMONE: CPT

## 2023-10-23 PROCEDURE — 99285 EMERGENCY DEPT VISIT HI MDM: CPT

## 2023-10-23 PROCEDURE — 6370000000 HC RX 637 (ALT 250 FOR IP): Performed by: EMERGENCY MEDICINE

## 2023-10-23 PROCEDURE — 83880 ASSAY OF NATRIURETIC PEPTIDE: CPT

## 2023-10-23 PROCEDURE — 80179 DRUG ASSAY SALICYLATE: CPT

## 2023-10-23 PROCEDURE — 94640 AIRWAY INHALATION TREATMENT: CPT

## 2023-10-23 PROCEDURE — 85379 FIBRIN DEGRADATION QUANT: CPT

## 2023-10-23 PROCEDURE — 83735 ASSAY OF MAGNESIUM: CPT

## 2023-10-23 RX ORDER — TRAZODONE HYDROCHLORIDE 50 MG/1
100 TABLET ORAL NIGHTLY
Status: DISCONTINUED | OUTPATIENT
Start: 2023-10-23 | End: 2023-10-24 | Stop reason: HOSPADM

## 2023-10-23 RX ORDER — ASPIRIN 81 MG/1
324 TABLET, CHEWABLE ORAL DAILY
Status: DISCONTINUED | OUTPATIENT
Start: 2023-10-23 | End: 2023-10-24 | Stop reason: HOSPADM

## 2023-10-23 RX ORDER — QUETIAPINE FUMARATE 25 MG/1
25 TABLET, FILM COATED ORAL NIGHTLY
Status: DISCONTINUED | OUTPATIENT
Start: 2023-10-23 | End: 2023-10-24 | Stop reason: HOSPADM

## 2023-10-23 RX ORDER — LORAZEPAM 2 MG/ML
2 INJECTION INTRAMUSCULAR
Status: DISCONTINUED | OUTPATIENT
Start: 2023-10-23 | End: 2023-10-24 | Stop reason: HOSPADM

## 2023-10-23 RX ORDER — LORAZEPAM 2 MG/ML
3 INJECTION INTRAMUSCULAR
Status: DISCONTINUED | OUTPATIENT
Start: 2023-10-23 | End: 2023-10-24 | Stop reason: HOSPADM

## 2023-10-23 RX ORDER — LORAZEPAM 1 MG/1
2 TABLET ORAL
Status: DISCONTINUED | OUTPATIENT
Start: 2023-10-23 | End: 2023-10-24 | Stop reason: HOSPADM

## 2023-10-23 RX ORDER — MULTIVITAMIN WITH IRON
1 TABLET ORAL DAILY
Status: DISCONTINUED | OUTPATIENT
Start: 2023-10-23 | End: 2023-10-24 | Stop reason: HOSPADM

## 2023-10-23 RX ORDER — LORAZEPAM 1 MG/1
3 TABLET ORAL
Status: DISCONTINUED | OUTPATIENT
Start: 2023-10-23 | End: 2023-10-24 | Stop reason: HOSPADM

## 2023-10-23 RX ORDER — LORAZEPAM 2 MG/ML
4 INJECTION INTRAMUSCULAR
Status: DISCONTINUED | OUTPATIENT
Start: 2023-10-23 | End: 2023-10-24 | Stop reason: HOSPADM

## 2023-10-23 RX ORDER — LORAZEPAM 1 MG/1
1 TABLET ORAL
Status: DISCONTINUED | OUTPATIENT
Start: 2023-10-23 | End: 2023-10-24 | Stop reason: HOSPADM

## 2023-10-23 RX ORDER — LORAZEPAM 1 MG/1
4 TABLET ORAL
Status: DISCONTINUED | OUTPATIENT
Start: 2023-10-23 | End: 2023-10-24 | Stop reason: HOSPADM

## 2023-10-23 RX ORDER — SODIUM CHLORIDE 0.9 % (FLUSH) 0.9 %
5-40 SYRINGE (ML) INJECTION EVERY 12 HOURS SCHEDULED
Status: DISCONTINUED | OUTPATIENT
Start: 2023-10-24 | End: 2023-10-24 | Stop reason: HOSPADM

## 2023-10-23 RX ORDER — THIAMINE HYDROCHLORIDE 100 MG/ML
100 INJECTION, SOLUTION INTRAMUSCULAR; INTRAVENOUS DAILY
Status: DISCONTINUED | OUTPATIENT
Start: 2023-10-23 | End: 2023-10-24 | Stop reason: HOSPADM

## 2023-10-23 RX ORDER — SODIUM CHLORIDE 0.9 % (FLUSH) 0.9 %
5-40 SYRINGE (ML) INJECTION PRN
Status: DISCONTINUED | OUTPATIENT
Start: 2023-10-23 | End: 2023-10-24 | Stop reason: HOSPADM

## 2023-10-23 RX ORDER — DIPHENHYDRAMINE HYDROCHLORIDE 50 MG/ML
50 INJECTION INTRAMUSCULAR; INTRAVENOUS
Status: COMPLETED | OUTPATIENT
Start: 2023-10-23 | End: 2023-10-23

## 2023-10-23 RX ORDER — SODIUM CHLORIDE 9 MG/ML
INJECTION, SOLUTION INTRAVENOUS PRN
Status: DISCONTINUED | OUTPATIENT
Start: 2023-10-23 | End: 2023-10-24 | Stop reason: HOSPADM

## 2023-10-23 RX ORDER — PROCHLORPERAZINE EDISYLATE 5 MG/ML
10 INJECTION INTRAMUSCULAR; INTRAVENOUS
Status: COMPLETED | OUTPATIENT
Start: 2023-10-23 | End: 2023-10-23

## 2023-10-23 RX ORDER — ALBUTEROL SULFATE 2.5 MG/3ML
2.5 SOLUTION RESPIRATORY (INHALATION) ONCE
Status: COMPLETED | OUTPATIENT
Start: 2023-10-23 | End: 2023-10-23

## 2023-10-23 RX ORDER — LORAZEPAM 2 MG/ML
1 INJECTION INTRAMUSCULAR
Status: DISCONTINUED | OUTPATIENT
Start: 2023-10-23 | End: 2023-10-24 | Stop reason: HOSPADM

## 2023-10-23 RX ORDER — SUCRALFATE 1 G/1
1 TABLET ORAL
Status: COMPLETED | OUTPATIENT
Start: 2023-10-23 | End: 2023-10-23

## 2023-10-23 RX ORDER — ONDANSETRON 2 MG/ML
4 INJECTION INTRAMUSCULAR; INTRAVENOUS
Status: COMPLETED | OUTPATIENT
Start: 2023-10-23 | End: 2023-10-23

## 2023-10-23 RX ADMIN — ONDANSETRON 4 MG: 2 INJECTION INTRAMUSCULAR; INTRAVENOUS at 23:27

## 2023-10-23 RX ADMIN — METOPROLOL TARTRATE 25 MG: 25 TABLET, FILM COATED ORAL at 19:41

## 2023-10-23 RX ADMIN — TRAZODONE HYDROCHLORIDE 100 MG: 50 TABLET ORAL at 23:27

## 2023-10-23 RX ADMIN — B-COMPLEX W/ C & FOLIC ACID TAB 1 TABLET: TAB at 19:41

## 2023-10-23 RX ADMIN — DIPHENHYDRAMINE HYDROCHLORIDE 50 MG: 50 INJECTION INTRAMUSCULAR; INTRAVENOUS at 23:27

## 2023-10-23 RX ADMIN — ALBUTEROL SULFATE 2.5 MG: 2.5 SOLUTION RESPIRATORY (INHALATION) at 21:40

## 2023-10-23 RX ADMIN — CHLORDIAZEPOXIDE HYDROCHLORIDE 25 MG: 10 CAPSULE ORAL at 20:42

## 2023-10-23 RX ADMIN — ASPIRIN 324 MG: 81 TABLET, CHEWABLE ORAL at 19:41

## 2023-10-23 RX ADMIN — THIAMINE HYDROCHLORIDE 100 MG: 100 INJECTION, SOLUTION INTRAMUSCULAR; INTRAVENOUS at 19:41

## 2023-10-23 RX ADMIN — PROCHLORPERAZINE EDISYLATE 10 MG: 5 INJECTION INTRAMUSCULAR; INTRAVENOUS at 22:49

## 2023-10-23 RX ADMIN — APIXABAN 5 MG: 5 TABLET, FILM COATED ORAL at 23:27

## 2023-10-23 RX ADMIN — QUETIAPINE FUMARATE 25 MG: 25 TABLET ORAL at 23:27

## 2023-10-23 RX ADMIN — SUCRALFATE 1 G: 1 TABLET ORAL at 19:41

## 2023-10-23 ASSESSMENT — ENCOUNTER SYMPTOMS
DIARRHEA: 0
CONSTIPATION: 0
NAUSEA: 0
VOMITING: 0
ABDOMINAL PAIN: 1

## 2023-10-23 NOTE — ED TRIAGE NOTES
Ems called by roommate for intoxication, upon ems arrival patient reported SI with attempt to walk in front of car this am

## 2023-10-23 NOTE — ED PROVIDER NOTES
K/uL    RBC 5.48 4.23 - 5.6 M/uL    Hemoglobin 15.4 13.6 - 17.2 g/dL    Hematocrit 48.5 41.1 - 50.3 %    MCV 88.5 82 - 102 FL    MCH 28.1 26.1 - 32.9 PG    MCHC 31.8 31.4 - 35.0 g/dL    RDW 15.7 (H) 11.9 - 14.6 %    Platelets 060 422 - 821 K/uL    MPV 9.9 9.4 - 12.3 FL    nRBC 0.00 0.0 - 0.2 K/uL    Differential Type AUTOMATED      Neutrophils % 54 43 - 78 %    Lymphocytes % 35 13 - 44 %    Monocytes % 9 4.0 - 12.0 %    Eosinophils % 1 0.5 - 7.8 %    Basophils % 1 0.0 - 2.0 %    Immature Granulocytes 0 0.0 - 5.0 %    Neutrophils Absolute 4.7 1.7 - 8.2 K/UL    Lymphocytes Absolute 3.1 0.5 - 4.6 K/UL    Monocytes Absolute 0.7 0.1 - 1.3 K/UL    Eosinophils Absolute 0.1 0.0 - 0.8 K/UL    Basophils Absolute 0.1 0.0 - 0.2 K/UL    Absolute Immature Granulocyte 0.0 0.0 - 0.5 K/UL   CMP   Result Value Ref Range    Sodium 140 133 - 143 mmol/L    Potassium 5.0 3.5 - 5.1 mmol/L    Chloride 107 101 - 110 mmol/L    CO2 24 21 - 32 mmol/L    Anion Gap 9 2 - 11 mmol/L    Glucose 89 65 - 100 mg/dL    BUN 4 (L) 8 - 23 MG/DL    Creatinine 0.80 0.8 - 1.5 MG/DL    Est, Glom Filt Rate >60 >60 ml/min/1.73m2    Calcium 8.6 8.3 - 10.4 MG/DL    Total Bilirubin 0.6 0.2 - 1.1 MG/DL    ALT 32 12 - 65 U/L    AST 61 (H) 15 - 37 U/L    Alk Phosphatase 101 50 - 136 U/L    Total Protein 9.0 (H) 6.3 - 8.2 g/dL    Albumin 3.6 3.2 - 4.6 g/dL    Globulin 5.4 (H) 2.8 - 4.5 g/dL    Albumin/Globulin Ratio 0.7 0.4 - 1.6     Acetaminophen Level   Result Value Ref Range    Acetaminophen Level <2 (L) 10.0 - 30.0 ug/mL   ETOH   Result Value Ref Range    Ethanol Lvl 163 MG/DL   Magnesium   Result Value Ref Range    Magnesium 2.2 1.8 - 2.4 mg/dL   Urine Drug Screen   Result Value Ref Range    PCP, Urine Negative NEG      Benzodiazepines, Urine Negative NEG      Cocaine, Urine Negative NEG      Amphetamine, Urine Negative NEG      Methadone, Urine Negative NEG      THC, TH-Cannabinol, Urine Negative NEG      Opiates, Urine Negative NEG      Barbiturates, Urine

## 2023-10-24 VITALS
TEMPERATURE: 97.8 F | HEART RATE: 104 BPM | RESPIRATION RATE: 21 BRPM | DIASTOLIC BLOOD PRESSURE: 108 MMHG | BODY MASS INDEX: 34.86 KG/M2 | OXYGEN SATURATION: 94 % | WEIGHT: 230 LBS | SYSTOLIC BLOOD PRESSURE: 149 MMHG | HEIGHT: 68 IN

## 2023-10-24 LAB
EKG ATRIAL RATE: 0 BPM
EKG DIAGNOSIS: NORMAL
EKG Q-T INTERVAL: 367 MS
EKG QRS DURATION: 95 MS
EKG QTC CALCULATION (BAZETT): 495 MS
EKG R AXIS: 78 DEGREES
EKG T AXIS: 68 DEGREES
EKG VENTRICULAR RATE: 109 BPM

## 2023-10-24 PROCEDURE — 93010 ELECTROCARDIOGRAM REPORT: CPT | Performed by: INTERNAL MEDICINE

## 2023-10-24 PROCEDURE — 6360000002 HC RX W HCPCS: Performed by: EMERGENCY MEDICINE

## 2023-10-24 PROCEDURE — 96374 THER/PROPH/DIAG INJ IV PUSH: CPT

## 2023-10-24 PROCEDURE — 96376 TX/PRO/DX INJ SAME DRUG ADON: CPT

## 2023-10-24 PROCEDURE — 6370000000 HC RX 637 (ALT 250 FOR IP): Performed by: EMERGENCY MEDICINE

## 2023-10-24 RX ADMIN — LORAZEPAM 1 MG: 2 INJECTION INTRAMUSCULAR; INTRAVENOUS at 08:50

## 2023-10-24 RX ADMIN — LORAZEPAM 2 MG: 2 INJECTION INTRAMUSCULAR; INTRAVENOUS at 00:04

## 2023-10-24 RX ADMIN — THIAMINE HYDROCHLORIDE 100 MG: 100 INJECTION, SOLUTION INTRAMUSCULAR; INTRAVENOUS at 08:40

## 2023-10-24 RX ADMIN — METOPROLOL TARTRATE 25 MG: 25 TABLET, FILM COATED ORAL at 08:40

## 2023-10-24 RX ADMIN — LORAZEPAM 1 MG: 2 INJECTION INTRAMUSCULAR; INTRAVENOUS at 05:58

## 2023-10-24 RX ADMIN — APIXABAN 5 MG: 5 TABLET, FILM COATED ORAL at 08:40

## 2023-10-24 RX ADMIN — LORAZEPAM 3 MG: 2 INJECTION INTRAMUSCULAR; INTRAVENOUS at 07:35

## 2023-10-24 RX ADMIN — ASPIRIN 324 MG: 81 TABLET, CHEWABLE ORAL at 08:39

## 2023-10-24 RX ADMIN — B-COMPLEX W/ C & FOLIC ACID TAB 1 TABLET: TAB at 08:39

## 2023-10-24 NOTE — CARE COORDINATION
CM received call from [facility] with [facility] admissions pt has been accepted to their Beta Unit by Dr Shahzad Smith, report to be called to [phone]. Primary RN Rachid given information to call report, Md updated and will complete transfer orders.        10/24/23 3519   Service Assessment   Patient Orientation Alert and Oriented   CM/SW Referral Psychiatry   Discharge Planning   Patient expects to be discharged to: Behavioral Health/Substance/Detox   Condition of Participation: Discharge Planning   The Plan for Transition of Care is related to the following treatment goals: to inpt psych unit d/t SI thoughts
History of falls?  0   Services At/After Discharge   Transition of Care Consult (CM Consult) N/A   Services At/After Discharge None   Condition of Participation: Discharge Planning   The Plan for Transition of Care is related to the following treatment goals: inpt psych admission for SI

## 2023-10-24 NOTE — ED NOTES
Patient sleeping in bed. Sitter at bedside. Respirations even and unlabored. Suicide precautions in place.         Gisela Flores RN  10/24/23 9659

## 2023-10-24 NOTE — ED NOTES
TRANSFER - OUT REPORT:    Verbal report given to Trev del real RN on [address]  being transferred to [facility] for routine progression of patient care       Report consisted of patient's Situation, Background, Assessment and   Recommendations(SBAR). Information from the following report(s) ED SBAR was reviewed with the receiving nurse. Holland Fall Assessment:    Presents to emergency department  because of falls (Syncope, seizure, or loss of consciousness): No  Age > 70: No  Altered Mental Status, Intoxication with alcohol or substance confusion (Disorientation, impaired judgment, poor safety awaremess, or inability to follow instructions): Yes  Impaired Mobility: Ambulates or transfers with assistive devices or assistance; Unable to ambulate or transer.: Yes  Nursing Judgement: Yes          Lines:   Peripheral IV 10/24/23 Right Hand (Active)   Site Assessment Clean, dry & intact 10/24/23 0128   Line Status Blood return noted;Normal saline locked 10/24/23 0128   Phlebitis Assessment No symptoms 10/24/23 0128   Infiltration Assessment 0 10/24/23 0128   Dressing Status New dressing applied 10/24/23 0128   Dressing Type Transparent 10/24/23 0128        Opportunity for questions and clarification was provided.       Patient transported with:  Sunny Garza RN  10/24/23 6806

## 2023-10-24 NOTE — ED NOTES
Pt still complaints of not being able to breathe, states breathing tx made symptoms worse, lung sounds clear, O2 96% MD RAFAEL notified.       Luz Marina Velazquez  10/23/23 2206

## 2025-06-25 ENCOUNTER — APPOINTMENT (OUTPATIENT)
Dept: GENERAL RADIOLOGY | Age: 65
DRG: 201 | End: 2025-06-25
Payer: MEDICAID

## 2025-06-25 ENCOUNTER — APPOINTMENT (OUTPATIENT)
Dept: CT IMAGING | Age: 65
DRG: 201 | End: 2025-06-25
Payer: MEDICAID

## 2025-06-25 ENCOUNTER — HOSPITAL ENCOUNTER (INPATIENT)
Age: 65
LOS: 5 days | Discharge: ANOTHER HOSPITAL | DRG: 201 | End: 2025-06-30
Attending: EMERGENCY MEDICINE | Admitting: INTERNAL MEDICINE
Payer: MEDICAID

## 2025-06-25 PROBLEM — E83.42 HYPOMAGNESEMIA: Status: ACTIVE | Noted: 2025-06-25

## 2025-06-25 PROBLEM — I48.91 A-FIB (HCC): Status: ACTIVE | Noted: 2025-06-25

## 2025-06-25 PROBLEM — I10 HYPERTENSION: Status: ACTIVE | Noted: 2025-06-25

## 2025-06-25 PROBLEM — T14.91XA SUICIDAL BEHAVIOR WITH ATTEMPTED SELF-INJURY (HCC): Status: ACTIVE | Noted: 2025-06-25

## 2025-06-25 PROBLEM — E87.20 LACTIC ACIDOSIS: Status: ACTIVE | Noted: 2025-06-25

## 2025-06-25 ASSESSMENT — PAIN SCALES - GENERAL
PAINLEVEL_OUTOF10: 8
PAINLEVEL_OUTOF10: 7
PAINLEVEL_OUTOF10: 5
PAINLEVEL_OUTOF10: 7

## 2025-06-25 ASSESSMENT — PAIN DESCRIPTION - LOCATION
LOCATION: HEAD
LOCATION: HEAD
LOCATION: ABDOMEN

## 2025-06-25 ASSESSMENT — LIFESTYLE VARIABLES
HOW MANY STANDARD DRINKS CONTAINING ALCOHOL DO YOU HAVE ON A TYPICAL DAY: 5 OR 6
HOW OFTEN DO YOU HAVE A DRINK CONTAINING ALCOHOL: 2-3 TIMES A WEEK

## 2025-06-25 ASSESSMENT — PAIN - FUNCTIONAL ASSESSMENT: PAIN_FUNCTIONAL_ASSESSMENT: 0-10

## 2025-06-25 NOTE — PROGRESS NOTES
Accessed patient's chart due to patient possibly coming to the 4th floor. RN notified by charge nurse that patient is going to ICU.

## 2025-06-25 NOTE — ED NOTES
Constant Observer Yes - Name: Alexandra Marcano Observer Oriented yes   High risk patients are in line of sight at all times Yes   Excess equipment/medical supplies not necessary for the care of the patient removed Yes   All sharp or dangerous objects are removed from room: including but not limited to belts, pens & pencils, needles, medications, cosmetics, lighters, matches, nail files, watches, necklaces, glass objects, razors, razor blades, knives, aerosol sprays, drawstring pants, shoes, cords (telephone, call bells, etc.) cleaning wipes or other cleaning items, aluminum cans, not permanently attached wall décor Yes   Telephone/cell phone removed as well as TV remote (batteries can be swallowed) Yes   Patient belongings removed and labeled at nurses station Yes   Excess linen is removed from room Yes   All plastic bags are removed from the room and replaced with paper trash bags Yes   Patient is in paper scrubs or appropriate gown and using hospital socks with rubber soles Yes   No metal, hard eating utensils or hard plates are on meal tray Yes   Remove all cleaning agents used by Environmental Services Yes   If Crucifix is hanging on a nail, remove Crucifix as well as the nail Yes       *If any question above is answered \"No,\" documentation is required.       Lavon Barrera RN  06/25/25 2754

## 2025-06-25 NOTE — CONSULTS
worker was involved in discharge planning. He is now stable and being discharged home with referral to [facility] and [facility].     2-1-2016 - Admission - [facility] - The patient is a 55-year-old male with a past medical history of alcoholism, alcohol withdrawal  seizures, hypertension, stroke, chronic left shoulder pain, anxiety disorder, and head injury,  presenting with impending alcohol withdrawal. Apparently, a friend called 911 and the patient was  transferred to the Emergency Department via EMS. The patient complained of withdrawal type  symptomatology on assessment.  The patient was admitted and placed on the CIWA protocol with additional scheduled Serax. The patient  received multivitamin, folate and thiamine. The patient required magnesium supplementation secondary  to hypomagnesemia. The patient was counseled in regards to tobacco cessation.  Psychiatry was consulted and started Celexa for suspected depression. There was question of possible  suicidal tendencies; however, the patient at this point does not endorse suicidal ideation nor  homicidal ideation.   Mr. Shelley is a 55-year-old, twice-   white male with a long history of anxiety and alcohol abuse who presented to the Emergency  Department on February 1, 2016, with complaints of tingling and numbness to the right side of his face,  right hand, and right foot, with progressive worsening for two days. The patient also has a recent history  of stroke in January 2016. The patient was admitted to the hospital with alcoholic ketoacidosis and  impending alcohol withdrawal. The patient had been drinking one pint of vodka daily and not eating. On  February 2, 2016, Social Work reported that the patient requested to receive Home Health services at a  [employer] or [employer] restaurant. Last night, the patient told his nurse that a friend had committed  suicide, and he stated \"it might would be easier if I just did that, it  [email or web address] - he alludes that there was a problem with the fee for staying there - stating his payee (at [facility]) was trying to work it out but couldn't - so he left [facility] [email or web address] and payee assisted him in getting into a house with roommates.  He confirms he was drinking Vodka yesterday (noted ETOH level 268 on arrival) - he denies drug use (no UDS collected at this time).  He states when he runs out of medications - he starts drinking.  He confirms his worsening depression and sleep disturbances off his medications - contributing to statements of SI made to staff - he states he feels better today - denies any current or active SI/HI - no intent or plan.  Discussed this - risks of substance use - including in combination with medications, risks to physical and mental health - pt verbalized understanding.  Pt denies any AVH and does not appear to be responding to any internal stimuli at this time.  Pt states \"I may just need another day of sleep and meals.\"  Discussed this - made pt aware that that is not an appropriate reason to be held in the ED.  Pt then states he has two checks from his payee that he needs to get cashed and needs to return to current home and get checks.  Pt confirmed he recently had initial appointment at [facility] - and has an upcoming appointment but unsure when it is - aware we will contact to confirm recent and upcoming appt.  Pt states he hasnt been eating much (with drinking) prior to coming into ER but has eaten since being here.  Pt continues to feel Seroquel and Trazodone are helpful \"at higher doses\" - again explained that since he has not recently been on these medications (possibly for about a month) we would need to drop doses and that [facility] can adjust when he returns.  Pt verbalized understanding.  Pt currently has NO noted N/V, sweating, agitation, visual disturbances, tremor, tactile disturbances, headache, auditory disturbances, or problems with
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
## 2025-06-25 NOTE — ED NOTES
.TRANSFER - OUT REPORT:    Verbal report given to Natasha RUTHERFORD on Jeffrey Shelley  being transferred to  3110 for routine progression of patient care       Report consisted of patient's Situation, Background, Assessment and   Recommendations(SBAR).     Information from the following report(s) ED Encounter Summary, ED SBAR, MAR, Recent Results, and Cardiac Rhythm A-Fib RVR was reviewed with the receiving nurse.    Bethel Fall Assessment:    Presents to emergency department  because of falls (Syncope, seizure, or loss of consciousness): No  Age > 70: No  Altered Mental Status, Intoxication with alcohol or substance confusion (Disorientation, impaired judgment, poor safety awaremess, or inability to follow instructions): No  Impaired Mobility: Ambulates or transfers with assistive devices or assistance; Unable to ambulate or transer.: No             Lines:   Peripheral IV 06/25/25 Left;Posterior Wrist (Active)        Opportunity for questions and clarification was provided.      Patient transported with:  Monitor, O2 @ 2lpm, and Registered Nurse          Coquin, Jordane, RN  06/25/25 [id number]

## 2025-06-25 NOTE — CARE COORDINATION
Patient with suicide attempt if placed on IVC papers which  11:15 am 25.  Patient is not medically cleared at this time so referrals are held until cleared.
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
## 2025-06-25 NOTE — PROGRESS NOTES
TRANSFER - IN REPORT:    Verbal report received from KRISH Ngo on Jeffrey Shelley  being received from ED for routine progression of patient care      Report consisted of patient's Situation, Background, Assessment and   Recommendations(SBAR).     Information from the following report(s) Nurse Handoff Report, ED Encounter Summary, and ED SBAR was reviewed with the receiving nurse.    Opportunity for questions and clarification was provided.      Assessment completed upon patient's arrival to unit and care assumed.       Pt arrived via stretcher, accompanied by sitter and security.

## 2025-06-25 NOTE — ED PROVIDER NOTES
Emergency Department Provider Note       [facility] EMERGENCY DEPT   PCP: File, Not On   Age: 64 y.o.   Sex: male     DISPOSITION      ICD-10-CM    1. Suicidal ideation  R45.851       2. Atrial fibrillation with RVR (HCC)  I48.91       3. Dehydration  E86.0           Medical Decision Making     11:08 AM EDT  On review of the patient's prescription history, it looks like his last prescription medication for A-fib was metoprolol 25 as well as Eliquis that were filled with a 7-day supply on May 7.  He does present with his other medications for behavioral health meds including venlafaxine, etc.  But he is clearly been off of all A-fib medications.    Will obtain CT head given the possibility of anticoagulation with head injury.  Will also give fluid bolus, diltiazem bolus, and magnesium for A-fib with RVR.    2:36 PM EDT  HR now back up in 150s. Will plan for admission to the hospital for treatment of a fib rvr likely triggered by medication noncompliance. Lactic acid likely elevated due to dehydration. I see no source of infection at this time. I doubt sepsis.     Will admit to the hospitalist for further management     1 or more acute illnesses that pose a threat to life or bodily function.   Drug therapy given requiring intensive monitoring for toxicity.  Shared medical decision making was utilized in creating the patients health plan today.  I independently ordered and reviewed each unique test.    I reviewed external records: ED visit note from a different ED.   I reviewed external records: provider visit note from outside specialist.  I reviewed external records: previous imaging study including radiologist interpretation.   The patients assessment required an independent historian: ems.  The reason they were needed is important historical information not provided by the patient.  ED cardiac monitoring rhythm strip was ordered and interpreted:  atrial fibrillation with rapid ventricular rate  ST Segments:Normal ST  Exam  Vitals and nursing note reviewed.   Constitutional:       General: He is not in acute distress.     Appearance: Normal appearance. He is normal weight. He is not ill-appearing or toxic-appearing.   HENT:      Head: Normocephalic and atraumatic.      Comments: Right frontal forehead hematoma     Ears:      Comments: No hemotympanum     Mouth/Throat:      Mouth: Mucous membranes are moist.   Eyes:      General: No visual field deficit.     Extraocular Movements: Extraocular movements intact.      Pupils: Pupils are equal, round, and reactive to light.   Cardiovascular:      Rate and Rhythm: Tachycardia present. Rhythm irregular.      Pulses: Normal pulses.      Heart sounds: Normal heart sounds.   Pulmonary:      Effort: Pulmonary effort is normal. No respiratory distress.   Abdominal:      General: There is no distension.      Palpations: Abdomen is soft.      Tenderness: There is no abdominal tenderness.   Musculoskeletal:      Cervical back: Normal range of motion and neck supple.   Skin:     General: Skin is dry.      Findings: No rash.   Neurological:      General: No focal deficit present.      Mental Status: He is alert and oriented to person, place, and time. Mental status is at baseline.      GCS: GCS eye subscore is 4. GCS verbal subscore is 5. GCS motor subscore is 6.      Cranial Nerves: Cranial nerves 2-12 are intact. No cranial nerve deficit, dysarthria or facial asymmetry.      Sensory: Sensation is intact. No sensory deficit.      Motor: Motor function is intact. No weakness.      Coordination: Coordination is intact. Romberg sign negative. Coordination normal. Finger-Nose-Finger Test normal. Rapid alternating movements normal.      Gait: Gait is intact. Gait normal.   Psychiatric:         Mood and Affect: Mood normal.         Behavior: Behavior normal.          Procedures     Procedures    Orders placed during this emergency department visit:     Orders Placed This Encounter   Procedures    XR

## 2025-06-26 ENCOUNTER — APPOINTMENT (OUTPATIENT)
Dept: NON INVASIVE DIAGNOSTICS | Age: 65
DRG: 201 | End: 2025-06-26
Attending: STUDENT IN AN ORGANIZED HEALTH CARE EDUCATION/TRAINING PROGRAM
Payer: MEDICAID

## 2025-06-26 PROBLEM — F19.10 SUBSTANCE ABUSE (HCC): Status: ACTIVE | Noted: 2025-06-26

## 2025-06-26 PROBLEM — R45.851 SUICIDAL IDEATION: Status: ACTIVE | Noted: 2025-06-26

## 2025-06-26 ASSESSMENT — PAIN SCALES - GENERAL
PAINLEVEL_OUTOF10: 0
PAINLEVEL_OUTOF10: 9
PAINLEVEL_OUTOF10: 10
PAINLEVEL_OUTOF10: 0
PAINLEVEL_OUTOF10: 8
PAINLEVEL_OUTOF10: 0

## 2025-06-26 ASSESSMENT — PAIN DESCRIPTION - DESCRIPTORS
DESCRIPTORS: ACHING
DESCRIPTORS: ACHING

## 2025-06-26 ASSESSMENT — PAIN DESCRIPTION - LOCATION
LOCATION: CHEST;BACK
LOCATION: HEAD

## 2025-06-26 ASSESSMENT — PAIN DESCRIPTION - ORIENTATION: ORIENTATION: ANTERIOR

## 2025-06-26 NOTE — CARE COORDINATION
Case Management Assessment  Initial Evaluation    Date/Time of Evaluation: 6/26/2025 4:52 PM  Assessment Completed by: Alysha Evans RN    If patient is discharged prior to next notation, then this note serves as note for discharge by case management.    Patient Name: Jeffrey Shelley                   YOB: 1960  Diagnosis: Dehydration [E86.0]  Suicidal ideation [R45.851]  A-fib (HCC) [I48.91]  Atrial fibrillation with RVR (HCC) [I48.91]  Atrial fibrillation, unspecified type (HCC) [I48.91]                   Date / Time: 6/25/2025 10:23 AM    Patient Admission Status: Inpatient   Readmission Risk (Low < 19, Mod (19-27), High > 27): Readmission Risk Score: 12.7    Current PCP: File, Not On  PCP verified by CM? (P) Yes    Chart Reviewed: Yes      History Provided by: Patient  Patient Orientation: Alert and Oriented    Patient Cognition: Alert    Hospitalization in the last 30 days (Readmission):  No    If yes, Readmission Assessment in CM Navigator will be completed.    Advance Directives:      Code Status: Full Code   Patient's Primary Decision Maker is: Legal Next of Kin      Discharge Planning:    Patient lives with: (P) Other (Comment) Type of Home: (P) Other (Comment) (Pending)  Primary Care Giver: Self  Patient Support Systems include: Friends/Neighbors   Current Financial resources: (P) Medicaid (ATC SCOUT)  Current community resources: (P) None  Current services prior to admission: (P) Durable Medical Equipment            Current DME: (P) Cane            Type of Home Care services:       ADLS  Prior functional level: (P) Independent in ADLs/IADLs  Current functional level: (P) Assistance with the following:    PT AM-PAC: 11 /24  OT AM-PAC: 17 /24    Family can provide assistance at DC: (P) No  Would you like Case Management to discuss the discharge plan with any other family members/significant others, and if so, who? (P) No  Plans to Return to Present Housing: (P) Unknown at present  Other

## 2025-06-26 NOTE — THERAPY EVALUATION
requiring SBA for safety. B socks donned prior to OOB mobility. Pt then performed sit<>stand transfers and bed to chair transfer using rolling walker with Baylee x2 and cues for safety awareness, walker management, hand placement, upright posture, and progression of all functional transfers. Pt endorsing increased nausea with mobility this date (RN notified). Following bed to chair transfer, pt assisted with simple self-grooming ADLs with setup. Pt positioned comfortably sitting up in chair at end of session with alarm on and all needs within reach. RN notified of pt's performance.    Pt presents as functioning below his baseline with overall deficits in ADL performance, functional transfers, household mobility for ADLs, strength, balance, and activity tolerance. Pt will benefit from skilled OT services at this time in order to address functional deficits and OT goals stated above. Recommend STR at d/c to maximize recovery, safety, and return to prior level of independence. Will continue to monitor and advance as indicated.         [facility] AM-PAC™ “6 Clicks” Daily Activity Inpatient Short Form:    AM-PAC Daily Activity - Inpatient   How much help is needed for putting on and taking off regular lower body clothing?: A Lot  How much help is needed for bathing (which includes washing, rinsing, drying)?: A Lot  How much help is needed for toileting (which includes using toilet, bedpan, or urinal)?: A Little  How much help is needed for putting on and taking off regular upper body clothing?: A Little  How much help is needed for taking care of personal grooming?: A Little  How much help for eating meals?: None  [facility] Inpatient Daily Activity Raw Score: 17  AM-PAC Inpatient ADL T-Scale Score : 37.26  ADL Inpatient CMS 0-100% Score: 50.11  ADL Inpatient CMS G-Code Modifier : CK    SUBJECTIVE:     Mr. Shelley states, \"I don't feel good\"     Social/Functional Home Equipment: Cane  Prior Level of Assist for ADLs:  Independent  Prior Level of Assist for Ambulation: Independent community ambulator, with or without device  Prior Level of Assist for Transfers: Independent    OBJECTIVE:     LINES / DRAINS / AIRWAY: Continuous Pulse Oximetry, IV, and Telemetry     RESTRICTIONS/PRECAUTIONS:  Restrictions/Precautions: Fall Risk    PAIN: VITALS / O2:   Pre Treatment:   Pain Assessment: None - Denies Pain      Post Treatment: no complaint of pain, 0/10       Vitals   Blood Pressure Sitting, Post Supine: 175/96  Blood Pressure Sitting, Post Transfer: 161/97  Blood Pressure Sitting, Legs Reclined: 157/93   (RN notified)    Oxygen  3L O2, SpO2 WNL          GROSS EVALUATION: INTACT IMPAIRED   (See Comments)   UE AROM [x] []   UE PROM [] []   Strength [] Generalized weakness throughout      Posture / Balance [] Sitting - Static: Fair, +  Sitting - Dynamic: Fair, +  Standing - Static: Fair  Standing - Dynamic: Fair, Fair -   Sensation [x]     Coordination [] Generally decreased      Tone []       Edema []    Activity Tolerance [] Patient limited by fatigue, weakness, and nausea     Hand Dominance R [] L []      COGNITION/  PERCEPTION: INTACT IMPAIRED   (See Comments)   Orientation [x]     Vision [x]     Hearing [x]     Cognition  []  Decreased safety awareness; cues for sequencing   Perception []       MOBILITY: I Mod I S SBA CGA Min Mod Max Total  NT x2 Comments:   Bed Mobility    Rolling [] [] [] [] [] [] [] [] [] [x] []    Supine to Sit [] [] [] [] [] [x] [] [] [] [] [x]    Scooting [] [] [] [] [] [x] [] [] [] [] [x]    Sit to Supine [] [] [] [] [] [] [] [] [] [x] []    Transfers    Sit to Stand [] [] [] [] [] [x] [] [] [] [] [x]    Bed to Chair [] [] [] [] [] [x] [] [] [] [] [x] Rolling walker    Stand to Sit [] [] [] [] [] [x] [] [] [] [] [x]    Tub/Shower [] [] [] [] [] [] [] [] [] [x] []     Toilet [] [] [] [] [] [] [] [] [] [x] []      [] [] [] [] [] [] [] [] [] [] []    I=Independent, Mod I=Modified Independent, S=Supervision/Setup,

## 2025-06-26 NOTE — PROGRESS NOTES
4 Eyes Skin Assessment     NAME:  Jeffrey Shelley  YOB: 1960  MEDICAL RECORD NUMBER:  [id number]    The patient is being assessed for  Admission    I agree that at least one RN has performed a thorough Head to Toe Skin Assessment on the patient. ALL assessment sites listed below have been assessed.      Areas assessed by both nurses:    Head, Face, Ears, Shoulders, Back, Chest, Arms, Elbows, Hands, Sacrum. Buttock, Coccyx, Ischium, Legs. Feet and Heels, and Under Medical Devices         Does the Patient have a Wound? Yes wound(s) were present on assessment. LDA wound assessment was Initiated and completed by RN Bruise on head, scrape on rt knee       Silviano Prevention initiated by RN: No  Wound Care Orders initiated by RN: No    Pressure Injury (Stage 3,4, Unstageable, DTI, NWPT, and Complex wounds) if present, place Wound referral order by RN under : No    New Ostomies, if present place, Ostomy referral order under : No     Nurse 1 eSignature: Electronically signed by Aspen Barger RN on 6/26/25 at 1:45 AM EDT    **SHARE this note so that the co-signing nurse can place an eSignature**    Nurse 2 eSignature: Electronically signed by Marcella Goff RN on 6/26/25 at 1:46 AM EDT
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
## 2025-06-26 NOTE — INTERDISCIPLINARY ROUNDS
Multi-D Rounds/Checklist (leapfrog):  Lines: can any be removed?: None      DVT Prophylaxis: Ordered  Vent: N/A  Nutrition Ordered/appropriate: Per Primary Team  Can antibiotics or other drugs be stopped? N/A Yes/No  MRSA swab:   Inpat Anti-Infectives (From admission, onward)      None          Consults needed: None  A: Is pain control adequate? (has PRNs? Stop drip?) Yes  B: Sedation break and SBT? N/A  C: Is sedation choice appropriate? N/A  D: Delirium/CAM-ICU? No  E: Mobility goals/appropriateness? N/A  F: Family update and plan? Girlfriend is surrogate decision maker and is being updated daily by primary attending and nursing staff.    RYAN Joshua

## 2025-06-26 NOTE — PROGRESS NOTES
[facility] CARDIOLOGY PROGRESS NOTE           6/26/2025 10:57 AM    Admit Date: 6/25/2025      Subjective:   nausea    Objective:      Vitals:    06/26/25 0630 06/26/25 0645 06/26/25 0724 06/26/25 0913   BP: (!) 161/79 (!) 163/77 (!) 156/80 (!) 203/99   Pulse: 78 86 94    Resp: 24 22     Temp:       TempSrc:       SpO2: 97% 93%     Weight:       Height:           Physical Exam:  General-No Acute Distress, intense coutenance  Neck- supple, no JVD  CV- regular rate and rhythm no MRG  Lung- clear bilaterally  Abd- soft, nontender, nondistended  Ext- no edema bilaterally.  Skin- warm and dry    Data Review:   Recent Labs     06/25/25  1105 06/25/25  1520 06/26/25  0413    137 135*   K 4.0 3.6 3.3*   MG 1.6*  --  1.7*   BUN 8 7* 6*   WBC 6.9  --  11.0   HGB 15.6  --  14.2   HCT 47.0  --  42.5     --  195   INR  --   --  1.4       Assessment/Plan:     Principal Problem:    A-fib (HCC)  Plan:   Active Problems:    Housing instability  Plan:     Depression  Plan:     Noncompliance  Plan:     Alcohol abuse  Plan:     Suicidal behavior with attempted self-injury (HCC)  Plan:     Lactic acidosis  Plan:     Hypomagnesemia  Plan:     Hypertension  Plan:     Substance abuse (HCC)  Plan:     Suicidal ideation  Plan:    ///  Unable to take po due to nausea  Continue prn iv metoprolol and hydralazine      ONEIL DEJESUS MD  6/26/2025 10:57 AM

## 2025-06-26 NOTE — PROGRESS NOTES
ACUTE PHYSICAL THERAPY GOALS:   (Developed with and agreed upon by patient and/or caregiver.)    (1.) Jeffrey Shelley  will move from supine to sit and sit to supine  with STAND BY ASSIST within 7 treatment day(s).    (2.) Jeffrey Shelley will transfer from bed to chair and chair to bed with STAND BY ASSIST using the least restrictive device within 7 treatment day(s).    (3.) Jeffrey Shelley will ambulate with STAND BY ASSIST for 150 feet with the least restrictive device within 7 treatment day(s).   (4.) Jeffrey Shelley will perform standing static and dynamic balance activities x 10 minutes with STAND BY ASSIST to improve safety within 7 treatment day(s).    PHYSICAL THERAPY Initial Assessment, Daily Note, and PM  (Link to Caseload Tracking: PT Visit Days : 1  Acknowledge Orders  Time In/Out  PT Charge Capture  Rehab Caseload Tracker    Jeffrey Shelley is a 64 y.o. male   PRIMARY DIAGNOSIS: A-fib (HCC)  Dehydration [E86.0]  Suicidal ideation [R45.851]  A-fib (HCC) [I48.91]  Atrial fibrillation with RVR (HCC) [I48.91]  Atrial fibrillation, unspecified type (HCC) [I48.91]       Reason for Referral: Generalized Muscle Weakness (M62.81)  Difficulty in walking, Not elsewhere classified (R26.2)  Inpatient: Payor: ABSOLUTE TOTAL C[employer] MEDICAID / Plan: ABSOL[employer] MEDICAID / Product Type: *No Product type* /     ASSESSMENT:     REHAB RECOMMENDATIONS:   Recommendation to date pending progress:  Setting:  Short-term Rehab    Equipment:    To Be Determined     ASSESSMENT:  Mr. Shelley is a 64 year old M who presents with above diagnoses. At baseline, pt reports occasional use of cane, independence with ADL's.  This date pt performs mobility including bed mobility with Baylee of 2. He was able to stand and ambulate a couple of feet to transfer to chair with RW and Baylee of 2. Limited by dizziness, nausea, weakness. RN notified. Recommend further rehab at d/c.  Pt presents as functioning below his baseline, with deficits in  Awareness  Decreased Strength  Decreased Transfer Abilities  Increased Pain INTERVENTIONS PLANNED:   (Benefits and precautions of physical therapy have been discussed with the patient.)  Self Care Training  Therapeutic Activity  Therapeutic Exercise/HEP  Neuromuscular Re-education  Gait Training  Education       TREATMENT:   EVALUATION: LOW COMPLEXITY: (Untimed Charge)  The initial evaluation charge encompasses clinical chart review, objective assessment, interpretation of assessment, and skilled monitoring of the patient's response to treatment in order to develop a plan of care.     TREATMENT:   Co-Treatment PT/OT necessary due to patient's decreased overall endurance/tolerance levels, as well as need for high level skilled assistance to complete functional transfers/mobility and functional tasks  Therapeutic Activity (24 Minutes): Therapeutic activity included Rolling, Supine to Sit, Scooting, Transfer Training, Ambulation on level ground, Sitting balance , and Standing balance to improve functional Activity tolerance, Balance, Mobility, and Strength.    TREATMENT GRID:  N/A    AFTER TREATMENT PRECAUTIONS: Bed/Chair Locked, Call light within reach, Chair, Needs within reach, RN notified, and sitter    INTERDISCIPLINARY COLLABORATION:  RN/ PCT, OT/ FERNANDEZ, and Sitter    EDUCATION: Education Given To: Patient  Education Provided: Plan of Care;Role of Therapy;Fall Prevention Strategies  Education Method: Verbal  Barriers to Learning: None  Education Outcome: Verbalized understanding  Educated patient and/or family/caregiver on the following: Assistive device indications/utilization/safety and Fall prevention strategies    TIME IN/OUT:  Time In: 1318  Time Out: 1350  Minutes: 32    RIVKA REVELES PT

## 2025-06-27 ASSESSMENT — PAIN DESCRIPTION - LOCATION
LOCATION: HEAD
LOCATION: HEAD

## 2025-06-27 ASSESSMENT — PAIN DESCRIPTION - DESCRIPTORS: DESCRIPTORS: THROBBING

## 2025-06-27 ASSESSMENT — PAIN SCALES - GENERAL
PAINLEVEL_OUTOF10: 0
PAINLEVEL_OUTOF10: 0
PAINLEVEL_OUTOF10: 8
PAINLEVEL_OUTOF10: 9
PAINLEVEL_OUTOF10: 0

## 2025-06-27 ASSESSMENT — PAIN DESCRIPTION - ORIENTATION: ORIENTATION: RIGHT

## 2025-06-27 NOTE — CARE COORDINATION
Chart reviewed by CM for continued stay. Pt remains in ICU and is currently on IVC papers. Per chart view, IVC papers to  tomorrow 25 at 1115. CM can assist with referrals for psychiatric care, pending medical stability.  CM continues to follow pt plan of care.

## 2025-06-27 NOTE — PROGRESS NOTES
ACUTE PHYSICAL THERAPY GOALS:   (Developed with and agreed upon by patient and/or caregiver.)  (1.) Jeffrey Shelley  will move from supine to sit and sit to supine  with STAND BY ASSIST within 7 treatment day(s).    (2.) Jeffrey Shelley will transfer from bed to chair and chair to bed with STAND BY ASSIST using the least restrictive device within 7 treatment day(s).    (3.) Jeffrey Shelley will ambulate with STAND BY ASSIST for 150 feet with the least restrictive device within 7 treatment day(s).   (4.) Jeffrey Shelley will perform standing static and dynamic balance activities x 10 minutes with STAND BY ASSIST to improve safety within 7 treatment day(s).    PHYSICAL THERAPY: Daily Note AM   (Link to Caseload Tracking: PT Visit Days : 2  Time In/Out PT Charge Capture  Rehab Caseload Tracker  Orders    Jeffrey Shelley is a 64 y.o. male   PRIMARY DIAGNOSIS: A-fib (HCC)  Dehydration [E86.0]  Suicidal ideation [R45.851]  A-fib (HCC) [I48.91]  Atrial fibrillation with RVR (HCC) [I48.91]  Atrial fibrillation, unspecified type (HCC) [I48.91]       Inpatient: Payor: ABSOLUTE [employer]OTAL C[employer] MEDICAID / Plan: [employer]OL[employer] MEDICAID / Product Type: *No Product type* /     ASSESSMENT:     REHAB RECOMMENDATIONS:   Recommendation to date pending progress:  Setting:  Short-term Rehab    Equipment:    To Be Determined     ASSESSMENT:  Mr. Shelley was supine upon contact and agreeable to PT. Of note, patient presents withdrawn with minimal verbalizations. Patient performed supine to sit with min assist x 2 and cues for technique/safety awareness as patient attempted to sit EOB several times before therapist was ready (lines removed/bed rail down, etc). Once seated EOB patient reports feeling dizzy but BP and all other vitals WNL. Patient transferred to standing and ambulated 4' to recliner chair with rolling walker, min assist x 2 and cues for technique/sequencing. Patient continues to c/o dizziness but all vitals continued to remain WNL.

## 2025-06-27 NOTE — PROGRESS NOTES
SPIRITUAL HEALTH   Note for Initial Spiritual Assessment                   Room # 3110/01    Name: Jeffrey Shelley           Age: 64 y.o.    Gender: male          MRN: [id number]  Restoration: [employer]       Preferred Language: English    Date: 06/27/25  Visit Time: Begin Time: 1050 End Time : 1100 Complexity of Encounter: High      Visit Summary:  responded to socially isolated consult. Patient was mildly engaged in the visit. Patient expressed conditions around his medical condition and life circumstances.  provided calming presence, active listening, spiritual and/or emotional support for the patient. Patient is notified of  support.  is available by referral for emergent needs.    Referral/Consult From: Nurse  Encounter Overview/Reason: Initial Encounter  Encounter Code:     Crisis (if applicable):    Service Provided For: Patient     Patient was available.    Tabitha, Belief, Meaning:   Patient unable to assess at this time  Family/Friends No family/friends present  Rituals (if applicable)      Importance and Influence:  Patient does not have spiritual/personal beliefs that influence decisions regarding their health  Family/Friends No family/friends present    Community:  Patient   indicated that they do not feel well-supported  Family/Friends   unable to assess at this time     Assessment and Plan of Care:   Emotions Expressed by Patient:   Assessment: Calm, Loneliness, Passive, Sad, Questioning meaning and purpose    Interventions by :   Intervention: Active listening, Confronted/Challenged, Life review/Legacy, Nurtured Hope, Sustaining Presence/Ministry of presence     Result/ Response by Patient:   Outcome: Engaged in conversation, Other (comment) (Very quiet)    Patient Plan of Care:           Electronically signed by

## 2025-06-27 NOTE — PROGRESS NOTES
Creatinine 0.60 (L) 0.80 - 1.30 MG/DL    Est, Glom Filt Rate >90 >60 ml/min/1.73m2    Calcium 7.8 (L) 8.8 - 10.2 MG/DL   Lactic Acid    Collection Time: 06/26/25  4:13 AM   Result Value Ref Range    Lactic Acid 1.9 0.5 - 2.0 mmol/L   CBC with Auto Differential    Collection Time: 06/26/25  4:13 AM   Result Value Ref Range    WBC 11.0 4.3 - 11.1 K/uL    RBC 4.71 4.23 - 5.6 M/uL    Hemoglobin 14.2 13.6 - 17.2 g/dL    Hematocrit 42.5 41.1 - 50.3 %    MCV 90.2 82 - 102 FL    MCH 30.1 26.1 - 32.9 PG    MCHC 33.4 31.4 - 35.0 g/dL    RDW 13.9 11.9 - 14.6 %    Platelets 195 150 - 450 K/uL    MPV 9.9 9.4 - 12.3 FL    nRBC 0.00 0.0 - 0.2 K/uL    Differential Type AUTOMATED      Neutrophils % 75.2 43.0 - 78.0 %    Lymphocytes % 16.0 13.0 - 44.0 %    Monocytes % 7.6 4.0 - 12.0 %    Eosinophils % 0.2 (L) 0.5 - 7.8 %    Basophils % 0.5 0.0 - 2.0 %    Immature Granulocytes % 0.5 0.0 - 5.0 %    Neutrophils Absolute 8.28 (H) 1.70 - 8.20 K/UL    Lymphocytes Absolute 1.76 0.50 - 4.60 K/UL    Monocytes Absolute 0.84 0.10 - 1.30 K/UL    Eosinophils Absolute 0.02 0.00 - 0.80 K/UL    Basophils Absolute 0.06 0.00 - 0.20 K/UL    Immature Granulocytes Absolute 0.05 0.0 - 0.5 K/UL   Magnesium    Collection Time: 06/26/25  4:13 AM   Result Value Ref Range    Magnesium 1.7 (L) 1.8 - 2.4 mg/dL   EKG 12 lead    Collection Time: 06/26/25  7:00 AM   Result Value Ref Range    Ventricular Rate 82 BPM    QRS Duration 90 ms    Q-T Interval 432 ms    QTc Calculation (Bazett) 504 ms    R Axis 75 degrees    T Axis 79 degrees    Diagnosis       Atrial fibrillation  Prolonged QT  Abnormal ECG  When compared with ECG of 25-JUN-2025 10:29,  PREVIOUS ECG IS PRESENT  Confirmed by MARCK ARANGO (), TAVO VALDERRAMA ([id number]) on 6/26/2025 7:35:12 AM     Echo (TTE) complete (PRN contrast/bubble/strain/3D)    Collection Time: 06/26/25  8:03 AM   Result Value Ref Range    LV EDV A2C 134 mL    LV EDV A4C 114 mL    LV ESV A2C 53 mL    LV ESV A4C 40 mL    IVSd 1.1 (A) 0.6 - 1.0

## 2025-06-27 NOTE — PROGRESS NOTES
[facility] CARDIOLOGY PROGRESS NOTE           6/27/2025 5:32 PM    Admit Date: 6/25/2025      Subjective:   No chest pain or shortness of breath  He has moved to telemetry  Objective:      Vitals:    06/27/25 1325 06/27/25 1415 06/27/25 1609 06/27/25 1728   BP: (!) 139/93   (!) 146/91   Pulse: 86   89   Resp: 22 20 18 18   Temp: 98.2 °F (36.8 °C)   98.2 °F (36.8 °C)   TempSrc: Oral   Oral   SpO2: 95%   94%   Weight:       Height:           Physical Exam:  General-No Acute Distress  Awake and alert, sitter in attendance    Data Review:   Recent Labs     06/26/25  0413 06/27/25  0359   * 138   K 3.3* 3.5   MG 1.7* 1.7*   BUN 6* 5*   WBC 11.0 7.7   HGB 14.2 13.8   HCT 42.5 42.4    165   INR 1.4  --        Assessment/Plan:     Principal Problem:    A-fib (HCC)  Plan:   Active Problems:    Housing instability  Plan:     Depression  Plan:     Noncompliance  Plan:     Alcohol abuse  Plan:     Suicidal behavior with attempted self-injury ([facility])  Plan:     Lactic acidosis  Plan:     Hypomagnesemia  Plan:     Hypertension  Plan:     Substance abuse (HCC)  Plan:     Suicidal ideation  Plan:   ///  Change metoprolol to succinate         ONEIL DEJESUS MD  6/27/2025 5:32 PM

## 2025-06-27 NOTE — PROGRESS NOTES
TRANSFER - IN REPORT:    Verbal report received from KRIHS Kaur on Jeffrey Shelley being received from ICU for routine progression of patient care      Report consisted of patient’s Situation, Background, Assessment and Recommendations(SBAR).     Information from the following report(s) SBAR was reviewed with the receiving nurse.    Opportunity for questions and clarification was provided.      Assessment completed upon patient’s arrival to unit and care assumed.

## 2025-06-27 NOTE — PROGRESS NOTES
ACUTE OCCUPATIONAL THERAPY GOALS:   (Developed with and agreed upon by patient and/or caregiver.)  1. Patient will complete lower body bathing and dressing with STANDBY ASSIST and adaptive equipment as needed.     2. Patient will complete toilet transfers and toileting with STANDBY ASSIST.  3. Patient will complete self-grooming ADL tasks at standing level with STANDBY ASSIST.  4. Patient will tolerate 25 minutes of OT treatment with 1-2 rest breaks to increase activity tolerance for ADLs.   5. Patient will complete functional transfers with STANDBY ASSIST and adaptive equipment as needed.   6. Patient will tolerate 10 minutes BUE exercises to increase strength for safe, functional transfers.      Timeframe: 7 visits      OCCUPATIONAL THERAPY: Daily Note AM   OT Visit Days: 2   Time In/Out  OT Charge Capture  Rehab Caseload Tracker  OT Orders    Jeffrey Shelley is a 64 y.o. male   PRIMARY DIAGNOSIS: A-fib (HCC)  Dehydration [E86.0]  Suicidal ideation [R45.851]  A-fib (HCC) [I48.91]  Atrial fibrillation with RVR (HCC) [I48.91]  Atrial fibrillation, unspecified type (HCC) [I48.91]       Inpatient: Payor: ABSOLUTE TOTAL C[employer] MEDICAID / Plan: ABSOL[employer] MEDICAID / Product Type: *No Product type* /     ASSESSMENT:     REHAB RECOMMENDATIONS:   Recommendation to date pending progress:  Setting:  Short-term Rehab    Equipment:   To Be Determined     ASSESSMENT:  Mr. Shelley is received supine in bed, agreeable to participate in the session. Impulsivity observed throughout today's visit, requiring frequent verbal, visual, and tactile cues for safety awareness, sustained attention, insight into deficits/ lines, and activity pacing. Pt performed bed mobility transfers/supine to sit EOB with Baylee x2 and additional time/cues for proper technique and sequencing. Pt presented with good/fair(+) sitting balance EOB requiring SBA-CGA for safety. Reports slight dizziness with position changes, vitals assessed/ monitored
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
## 2025-06-27 NOTE — PROGRESS NOTES
TRANSFER - OUT REPORT:    Verbal report given to KRISH Khan on Jeffrey Shelley  being transferred to [facility] for routine progression of patient care       Report consisted of patient's Situation, Background, Assessment and   Recommendations(SBAR).     Information from the following report(s) Nurse Handoff Report and Cardiac Rhythm AFIB was reviewed with the receiving nurse.           Lines:   Peripheral IV 06/25/25 Left;Anterior Forearm (Active)   Site Assessment Clean, dry & intact 06/27/25 0701   Line Status Flushed 06/27/25 0701   Line Care Connections checked and tightened 06/27/25 0701   Phlebitis Assessment No symptoms 06/27/25 0701   Infiltration Assessment 0 06/27/25 0701   Alcohol Cap Used Yes 06/27/25 0701   Dressing Status Clean, dry & intact 06/27/25 0701   Dressing Type Transparent 06/27/25 0701   Dressing Intervention New 06/26/25 0000       Peripheral IV 06/25/25 Right;Dorsal Cephalic (Active)   Site Assessment Clean, dry & intact 06/27/25 0400   Line Status Flushed;Capped 06/27/25 0400   Line Care Connections checked and tightened;Ports disinfected 06/27/25 0400   Phlebitis Assessment No symptoms 06/27/25 0400   Infiltration Assessment 0 06/27/25 0400   Alcohol Cap Used Yes 06/27/25 0400   Dressing Status Clean, dry & intact 06/27/25 0400   Dressing Type Transparent 06/27/25 0400   Dressing Intervention New 06/26/25 0000        Opportunity for questions and clarification was provided.      Patient transported with:  Monitor and Tech (SITTER AND REMOTE TELE BOX)    All patient belongings moved with patient including his brown guitar/case.

## 2025-06-28 ASSESSMENT — PAIN SCALES - GENERAL
PAINLEVEL_OUTOF10: 9
PAINLEVEL_OUTOF10: 10
PAINLEVEL_OUTOF10: 0
PAINLEVEL_OUTOF10: 8
PAINLEVEL_OUTOF10: 9
PAINLEVEL_OUTOF10: 8

## 2025-06-28 ASSESSMENT — PAIN DESCRIPTION - LOCATION
LOCATION: HEAD

## 2025-06-28 ASSESSMENT — PAIN DESCRIPTION - DESCRIPTORS
DESCRIPTORS: ACHING
DESCRIPTORS: SORE;ACHING
DESCRIPTORS: THROBBING
DESCRIPTORS: THROBBING
DESCRIPTORS: ACHING;THROBBING;DISCOMFORT
DESCRIPTORS: ACHING
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
## 2025-06-28 NOTE — PROGRESS NOTES
[facility] CARDIOLOGY PROGRESS NOTE           6/28/2025 9:31 AM    Admit Date: 6/25/2025      Subjective:   No chest pain or shortness of breath    Objective:      Vitals:    06/27/25 2043 06/27/25 2355 06/28/25 0415 06/28/25 0712   BP:  (!) 153/93 109/72 (!) 156/99   Pulse: 85 84 67 87   Resp:  20 18 17   Temp:  97.9 °F (36.6 °C) 98.2 °F (36.8 °C) 97.5 °F (36.4 °C)   TempSrc:  Oral Oral Axillary   SpO2:  95% 93% 96%   Weight:       Height:           Physical Exam:  General-No Acute Distress  Awake and alert, sitter in attendance  Having breakfast    Data Review:   Recent Labs     06/26/25  0413 06/27/25  0359 06/28/25  0324   * 138 138   K 3.3* 3.5 4.0   MG 1.7* 1.7*  --    BUN 6* 5* 10   WBC 11.0 7.7 6.5   HGB 14.2 13.8 13.6   HCT 42.5 42.4 41.7    165 165   INR 1.4  --   --        Assessment/Plan:     Principal Problem:    A-fib (HCC)  Plan:   Active Problems:    Housing instability  Plan:     Depression  Plan:     Noncompliance  Plan:     Alcohol abuse  Plan:     Suicidal behavior with attempted self-injury (HCC)  Plan:     Lactic acidosis  Plan:     Hypomagnesemia  Plan:     Hypertension  Plan:     Substance abuse (HCC)  Plan:     Suicidal ideation  Plan:   ///  Now on metoprolol succinate.  BP still high.  Will let  IM manage.  On standby currently       ONEIL DEJESUS MD  6/28/2025 9:31 AM

## 2025-06-28 NOTE — CARE COORDINATION
IVC papers renewed today at 1110. IVC papers will need to be renewed again on Tuesday, 7/1/25 at 1110. CM discussed with pt at length that services could be arranged such as mental health, SW and in-home physician services. In addition, pt receives over $900 from disability, writer could arrange a motel and other community resources, such as [employer] and get him back on SNAP benefits. Pt has been homeless, he said, for a few weeks. Had an apartment but stated that \"the  put him out.\" Pt would not elaborate on the situation. He has lived in [place] for 4yrs, is originally from [place]. Has no family supports. Pt continued to endorse suicidal intentions and could not contract for safety. We also discussed the possibility of a sober living home, said that he has been drinking since he was 14YO. Currently drinks a pint of vodka daily. Writer sent referrals to all in-[facility] psych facilities for possible placement. Zaria from [facility] called this writer and said that she could not see the referral in [facility] and asked to fax clinicals and labs to: [phone]; done. Pennie ([phone]) from [facility] contacted writer and stated that if by Monday the pts BP is WNL and he is off oxygen, they may accept the pt-pending updated clinical review. Will send labs on Monday. MD updated.     Sara from [facility] contacted writer ([phone] / cell [phone]). She reported that pts BP needs to be WNL and must be off oxygen to accept. Will send updated labs and clinicals at that time.

## 2025-06-28 NOTE — ICUWATCH
RRT Clinical Rounding Nurse Progress Report      SUBJECTIVE: Patient assessed secondary to transfer from critical care.      Vitals:    06/27/25 2355 06/28/25 0415 06/28/25 0712 06/28/25 1103   BP: (!) 153/93 109/72 (!) 156/99 (!) 150/104   Pulse: 84 67 87 87   Resp: 20 18 17 21   Temp: 97.9 °F (36.6 °C) 98.2 °F (36.8 °C) 97.5 °F (36.4 °C) 97.9 °F (36.6 °C)   TempSrc: Oral Oral Axillary    SpO2: 95% 93% 96% 94%   Weight:       Height:          ASSESSMENT:  Patient alert and calm. Requiring less coverage for CIWA. Respirations unlabored. Sitter at bedside. VS, labs, and progress notes reviewed. Discussed with primary RN.    PLAN:  Will discharge from RRT Clinical Rounding Program per protocol. Please call if needed.    Phani Webb RN  [facility]: [phone]  [facility]: [phone]

## 2025-06-29 ASSESSMENT — PAIN DESCRIPTION - LOCATION
LOCATION: HEAD

## 2025-06-29 ASSESSMENT — PAIN SCALES - GENERAL
PAINLEVEL_OUTOF10: 8
PAINLEVEL_OUTOF10: 8
PAINLEVEL_OUTOF10: 0
PAINLEVEL_OUTOF10: 0
PAINLEVEL_OUTOF10: 8
PAINLEVEL_OUTOF10: 9
PAINLEVEL_OUTOF10: 0
PAINLEVEL_OUTOF10: 6

## 2025-06-29 ASSESSMENT — PAIN DESCRIPTION - DESCRIPTORS
DESCRIPTORS: THROBBING;ACHING
DESCRIPTORS: THROBBING
DESCRIPTORS: SHARP
DESCRIPTORS: ACHING
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
## 2025-06-30 PROBLEM — E87.20 LACTIC ACIDOSIS: Status: RESOLVED | Noted: 2025-06-25 | Resolved: 2025-06-30

## 2025-06-30 PROBLEM — E83.42 HYPOMAGNESEMIA: Status: RESOLVED | Noted: 2025-06-25 | Resolved: 2025-06-30

## 2025-06-30 PROBLEM — Z91.199 NONCOMPLIANCE: Status: RESOLVED | Noted: 2023-06-22 | Resolved: 2025-06-30
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
## 2025-06-30 NOTE — PROGRESS NOTES
TRANSFER - OUT REPORT:    Verbal report given to Anabell on Jeffrey Shelley  being transferred to [facility] for routine progression of patient care       Report consisted of patient's Situation, Background, Assessment and   Recommendations(SBAR).     Information from the following report(s) Nurse Handoff Report, Adult Overview, MAR, Cardiac Rhythm Afib, and Neuro Assessment was reviewed with the receiving nurse.           Lines:       Opportunity for questions and clarification was provided.      Patient transported with: [employer]

## 2025-06-30 NOTE — PLAN OF CARE
Problem: Discharge Planning  Goal: Discharge to home or other facility with appropriate resources  Outcome: Progressing     Problem: Pain  Goal: Verbalizes/displays adequate comfort level or baseline comfort level  Outcome: Progressing     Problem: Risk for Elopement  Goal: Patient will not exit the unit/facility without proper excort  Outcome: Progressing     Problem: Neurosensory - Adult  Goal: Achieves stable or improved neurological status  Outcome: Progressing  Goal: Absence of seizures  Outcome: Progressing     Problem: Respiratory - Adult  Goal: Achieves optimal ventilation and oxygenation  Outcome: Progressing     Problem: Cardiovascular - Adult  Goal: Maintains optimal cardiac output and hemodynamic stability  Outcome: Progressing  Goal: Absence of cardiac dysrhythmias or at baseline  Outcome: Progressing     Problem: Skin/Tissue Integrity - Adult  Goal: Skin integrity remains intact  Description: 1.  Monitor for areas of redness and/or skin breakdown  2.  Assess vascular access sites hourly  3.  Every 4-6 hours minimum:  Change oxygen saturation probe site  4.  Every 4-6 hours:  If on nasal continuous positive airway pressure, respiratory therapy assess nares and determine need for appliance change or resting period  Outcome: Progressing  Goal: Incisions, wounds, or drain sites healing without S/S of infection  Outcome: Progressing  Goal: Oral mucous membranes remain intact  Outcome: Progressing     Problem: Musculoskeletal - Adult  Goal: Return mobility to safest level of function  Outcome: Progressing  Goal: Maintain proper alignment of affected body part  Outcome: Progressing  Goal: Return ADL status to a safe level of function  Outcome: Progressing     Problem: Gastrointestinal - Adult  Goal: Minimal or absence of nausea and vomiting  Outcome: Progressing  Goal: Maintains or returns to baseline bowel function  Outcome: Progressing  Goal: Maintains adequate nutritional intake  Outcome: Progressing    Care or initiate Family Care Conference as is appropriate  Outcome: Progressing     Problem: Confusion  Goal: Confusion, delirium, dementia, or psychosis is improved or at baseline  Description: INTERVENTIONS:  1. Assess for possible contributors to thought disturbance, including medications, impaired vision or hearing, underlying metabolic abnormalities, dehydration, psychiatric diagnoses, and notify attending LIP  2. Ethel high risk fall precautions, as indicated  3. Provide frequent short contacts to provide reality reorientation, refocusing and direction  4. Decrease environmental stimuli, including noise as appropriate  5. Monitor and intervene to maintain adequate nutrition, hydration, elimination, sleep and activity  6. If unable to ensure safety without constant attention obtain sitter and review sitter guidelines with assigned personnel  7. Initiate Psychosocial CNS and Spiritual Care consult, as indicated  Outcome: Progressing     Problem: Behavior  Goal: Pt/Family maintain appropriate behavior and adhere to behavioral management agreement, if implemented  Description: INTERVENTIONS:  1. Assess patient/family's coping skills and  non-compliant behavior (including use of illegal substances)  2. Notify security of behavior or suspected illegal substances which indicate the need for search of the family and/or belongings  3. Encourage verbalization of thoughts and concerns in a socially appropriate manner  4. Utilize positive, consistent limit setting strategies supporting safety of patient, staff and others  5. Encourage participation in the decision making process about the behavioral management agreement  6. If a visitor's behavior poses a threat to safety call refer to organization policy.  7. Initiate consult with , Psychosocial CNS, Spiritual Care as appropriate  Outcome: Progressing     Problem: Depression/Self Harm  Goal: Effect of psychiatric condition will be minimized and patient

## 2025-06-30 NOTE — PLAN OF CARE
Problem: Discharge Planning  Goal: Discharge to home or other facility with appropriate resources  6/30/2025 1032 by Melva Eduardo RN  Outcome: Completed  6/30/2025 0928 by Millie Lam RN  Outcome: Progressing  Flowsheets (Taken 6/30/2025 0753)  Discharge to home or other facility with appropriate resources:   Identify barriers to discharge with patient and caregiver   Identify discharge learning needs (meds, wound care, etc)   Arrange for needed discharge resources and transportation as appropriate   Refer to discharge planning if patient needs post-hospital services based on physician order or complex needs related to functional status, cognitive ability or social support system     Problem: Pain  Goal: Verbalizes/displays adequate comfort level or baseline comfort level  6/30/2025 1032 by Melva Eduardo RN  Outcome: Completed  6/30/2025 0928 by Millie Lam RN  Outcome: Progressing     Problem: Risk for Elopement  Goal: Patient will not exit the unit/facility without proper excort  6/30/2025 1032 by Melva Eduardo RN  Outcome: Completed  6/30/2025 0928 by Millie Lam RN  Outcome: Progressing  Flowsheets (Taken 6/30/2025 0753)  Nursing Interventions for Elopement Risk:   Assist with personal care needs such as toileting, eating, dressing, as needed to reduce the risk of wandering   Collaborate with family members/caregivers to mitigate the elopement risk   Collaborate with treatment team for drug withdrawal symptoms treatment     Problem: Neurosensory - Adult  Goal: Achieves stable or improved neurological status  6/30/2025 1032 by Melva Eduardo RN  Outcome: Completed  6/30/2025 0928 by Millie Lam RN  Outcome: Progressing  Goal: Absence of seizures  6/30/2025 1032 by Melva Eduardo RN  Outcome: Completed  6/30/2025 0928 by Millie Lam RN  Outcome: Progressing     Problem: Respiratory - Adult  Goal: Achieves optimal ventilation and oxygenation  6/30/2025 1032 by Jayce

## 2025-06-30 NOTE — CARE COORDINATION
Discharge order is in. Pts BP is WNL and is on RA, is medically stable for discharge. [facility] made bed offer. Dr Wilian robert MD, per Mary Beth. Call for report: [phone]. Pt calm and cooperated. [facility] scheduled for 1400. IVC papers completed and sent to the facility at [phone]. No other discharge needs identified by CM, tx goals met.     06/30/25 1123   Services At/After Discharge   Transition of Care Consult (CM Consult) Discharge Planning   Services At/After Discharge None   [facility] Resource Information Provided? No   Mode of Transport at Discharge [facility] Transport Time of Discharge 1400   Confirm Follow Up Transport Self   Condition of Participation: Discharge Planning   The Patient and/or Patient Representative was provided with a Choice of Provider? Patient   The Patient and/Or Patient Representative agree with the Discharge Plan? Yes   Freedom of Choice list was provided with basic dialogue that supports the patient's individualized plan of care/goals, treatment preferences, and shares the quality data associated with the providers?  Yes

## 2025-07-01 NOTE — CARE COORDINATION
Writer accessed chart to print copy of UDS for [facility]. Faxed copy to [phone].